# Patient Record
Sex: FEMALE | Race: WHITE | NOT HISPANIC OR LATINO | Employment: OTHER | URBAN - NONMETROPOLITAN AREA
[De-identification: names, ages, dates, MRNs, and addresses within clinical notes are randomized per-mention and may not be internally consistent; named-entity substitution may affect disease eponyms.]

---

## 2017-03-15 ENCOUNTER — COMMUNICATION - GICH (OUTPATIENT)
Dept: FAMILY MEDICINE | Facility: OTHER | Age: 72
End: 2017-03-15

## 2017-03-15 DIAGNOSIS — G89.4 CHRONIC PAIN SYNDROME: ICD-10-CM

## 2017-03-15 DIAGNOSIS — G62.9 POLYNEUROPATHY: ICD-10-CM

## 2017-04-19 ENCOUNTER — OFFICE VISIT - GICH (OUTPATIENT)
Dept: FAMILY MEDICINE | Facility: OTHER | Age: 72
End: 2017-04-19

## 2017-04-19 ENCOUNTER — MEDICAL CORRESPONDENCE (OUTPATIENT)
Dept: HEALTH INFORMATION MANAGEMENT | Facility: CLINIC | Age: 72
End: 2017-04-19

## 2017-04-19 ENCOUNTER — TRANSFERRED RECORDS (OUTPATIENT)
Dept: HEALTH INFORMATION MANAGEMENT | Facility: CLINIC | Age: 72
End: 2017-04-19

## 2017-04-19 ENCOUNTER — HISTORY (OUTPATIENT)
Dept: FAMILY MEDICINE | Facility: OTHER | Age: 72
End: 2017-04-19

## 2017-04-19 DIAGNOSIS — R40.0 SOMNOLENCE: ICD-10-CM

## 2017-04-19 DIAGNOSIS — I10 ESSENTIAL (PRIMARY) HYPERTENSION: ICD-10-CM

## 2017-04-19 DIAGNOSIS — G89.4 CHRONIC PAIN SYNDROME: ICD-10-CM

## 2017-04-19 DIAGNOSIS — G47.00 INSOMNIA: ICD-10-CM

## 2017-04-19 DIAGNOSIS — E06.3 AUTOIMMUNE THYROIDITIS: ICD-10-CM

## 2017-04-19 DIAGNOSIS — F33.1 MAJOR DEPRESSIVE DISORDER, RECURRENT, MODERATE (H): ICD-10-CM

## 2017-04-19 DIAGNOSIS — R53.83 OTHER FATIGUE: ICD-10-CM

## 2017-04-19 DIAGNOSIS — R53.81 OTHER MALAISE: ICD-10-CM

## 2017-04-19 DIAGNOSIS — F41.9 ANXIETY DISORDER: ICD-10-CM

## 2017-04-19 DIAGNOSIS — B37.2 CANDIDIASIS OF SKIN AND NAIL: ICD-10-CM

## 2017-04-19 DIAGNOSIS — G62.9 POLYNEUROPATHY: ICD-10-CM

## 2017-04-19 DIAGNOSIS — R94.6 ABNORMAL RESULTS OF THYROID FUNCTION STUDIES: ICD-10-CM

## 2017-04-19 LAB
FSH - HISTORICAL: 72.6 MIU/ML
LUTEINIZING HORMONE - HISTORICAL: 32.7 MIU/ML
PROLACTIN - HISTORICAL: 13.26 NG/ML
T3,FREE - HISTORICAL: 3.32 PG/ML (ref 2.5–3.9)
T4 FREE SERPL-MCNC: 0.77 NG/DL (ref 0.58–1.64)
TSH - HISTORICAL: <0.1 UIU/ML (ref 0.34–5.6)

## 2017-04-19 ASSESSMENT — ANXIETY QUESTIONNAIRES
3. WORRYING TOO MUCH ABOUT DIFFERENT THINGS: NEARLY EVERY DAY
GAD7 TOTAL SCORE: 15
7. FEELING AFRAID AS IF SOMETHING AWFUL MIGHT HAPPEN: NEARLY EVERY DAY
4. TROUBLE RELAXING: NOT AT ALL
6. BECOMING EASILY ANNOYED OR IRRITABLE: NEARLY EVERY DAY
1. FEELING NERVOUS, ANXIOUS, OR ON EDGE: NEARLY EVERY DAY
2. NOT BEING ABLE TO STOP OR CONTROL WORRYING: NEARLY EVERY DAY
5. BEING SO RESTLESS THAT IT IS HARD TO SIT STILL: NOT AT ALL

## 2017-04-19 ASSESSMENT — PATIENT HEALTH QUESTIONNAIRE - PHQ9: SUM OF ALL RESPONSES TO PHQ QUESTIONS 1-9: 17

## 2017-04-20 ENCOUNTER — AMBULATORY - GICH (OUTPATIENT)
Dept: FAMILY MEDICINE | Facility: OTHER | Age: 72
End: 2017-04-20

## 2017-04-24 ENCOUNTER — AMBULATORY - GICH (OUTPATIENT)
Dept: FAMILY MEDICINE | Facility: OTHER | Age: 72
End: 2017-04-24

## 2017-04-25 ENCOUNTER — AMBULATORY - GICH (OUTPATIENT)
Dept: FAMILY MEDICINE | Facility: OTHER | Age: 72
End: 2017-04-25

## 2017-05-11 ENCOUNTER — COMMUNICATION - GICH (OUTPATIENT)
Dept: FAMILY MEDICINE | Facility: OTHER | Age: 72
End: 2017-05-11

## 2017-05-11 DIAGNOSIS — B00.9 HERPESVIRAL INFECTION: ICD-10-CM

## 2017-06-14 ENCOUNTER — OFFICE VISIT (OUTPATIENT)
Dept: ENDOCRINOLOGY | Facility: CLINIC | Age: 72
End: 2017-06-14

## 2017-06-14 ENCOUNTER — AMBULATORY - GICH (OUTPATIENT)
Dept: SCHEDULING | Facility: OTHER | Age: 72
End: 2017-06-14

## 2017-06-14 VITALS — DIASTOLIC BLOOD PRESSURE: 102 MMHG | SYSTOLIC BLOOD PRESSURE: 159 MMHG | HEART RATE: 103 BPM

## 2017-06-14 DIAGNOSIS — E03.9 HYPOTHYROIDISM, UNSPECIFIED TYPE: Primary | ICD-10-CM

## 2017-06-14 DIAGNOSIS — R63.5 ABNORMAL WEIGHT GAIN: ICD-10-CM

## 2017-06-14 DIAGNOSIS — E03.8 CENTRAL HYPOTHYROIDISM: ICD-10-CM

## 2017-06-14 DIAGNOSIS — E05.90 SUBCLINICAL HYPERTHYROIDISM: ICD-10-CM

## 2017-06-14 DIAGNOSIS — E03.9 HYPOTHYROIDISM, UNSPECIFIED TYPE: ICD-10-CM

## 2017-06-14 DIAGNOSIS — E89.0 H/O PARTIAL THYROIDECTOMY: ICD-10-CM

## 2017-06-14 LAB
CORTIS SERPL-MCNC: 9.6 UG/DL (ref 4–22)
FSH SERPL-ACNC: 65.8 IU/L
LH SERPL-ACNC: 31.2 IU/L
PROLACTIN SERPL-MCNC: 8 UG/L (ref 3–27)
T3 SERPL-MCNC: 146 NG/DL (ref 60–181)
T3FREE SERPL-MCNC: 3.3 PG/ML (ref 2.3–4.2)
T4 FREE SERPL-MCNC: 0.92 NG/DL (ref 0.76–1.46)
T4 SERPL-MCNC: 8.7 UG/DL (ref 4.5–13.9)
TSH SERPL DL<=0.05 MIU/L-ACNC: 0.02 MU/L (ref 0.4–4)

## 2017-06-14 PROCEDURE — 86376 MICROSOMAL ANTIBODY EACH: CPT | Performed by: INTERNAL MEDICINE

## 2017-06-14 PROCEDURE — 84481 FREE ASSAY (FT-3): CPT | Performed by: INTERNAL MEDICINE

## 2017-06-14 PROCEDURE — 84436 ASSAY OF TOTAL THYROXINE: CPT | Performed by: INTERNAL MEDICINE

## 2017-06-14 PROCEDURE — 84146 ASSAY OF PROLACTIN: CPT | Performed by: INTERNAL MEDICINE

## 2017-06-14 PROCEDURE — 82024 ASSAY OF ACTH: CPT | Performed by: INTERNAL MEDICINE

## 2017-06-14 PROCEDURE — 82533 TOTAL CORTISOL: CPT | Performed by: INTERNAL MEDICINE

## 2017-06-14 PROCEDURE — 84480 ASSAY TRIIODOTHYRONINE (T3): CPT | Performed by: INTERNAL MEDICINE

## 2017-06-14 PROCEDURE — 83002 ASSAY OF GONADOTROPIN (LH): CPT | Performed by: INTERNAL MEDICINE

## 2017-06-14 PROCEDURE — 84305 ASSAY OF SOMATOMEDIN: CPT | Performed by: INTERNAL MEDICINE

## 2017-06-14 RX ORDER — VALACYCLOVIR HYDROCHLORIDE 1 G/1
TABLET, FILM COATED ORAL
COMMUNITY
Start: 2017-05-11 | End: 2018-09-05

## 2017-06-14 RX ORDER — GABAPENTIN 300 MG/1
CAPSULE ORAL
COMMUNITY
Start: 2017-04-19 | End: 2018-02-28

## 2017-06-14 RX ORDER — KETOCONAZOLE 20 MG/G
CREAM TOPICAL
COMMUNITY
Start: 2017-04-19

## 2017-06-14 RX ORDER — VENLAFAXINE HYDROCHLORIDE 75 MG/1
CAPSULE, EXTENDED RELEASE ORAL
COMMUNITY
Start: 2016-10-26 | End: 2018-02-28

## 2017-06-14 RX ORDER — PENICILLIN V POTASSIUM 250 MG/1
250 TABLET, FILM COATED ORAL
COMMUNITY
Start: 2016-10-26 | End: 2018-06-10

## 2017-06-14 RX ORDER — VENLAFAXINE HYDROCHLORIDE 150 MG/1
CAPSULE, EXTENDED RELEASE ORAL
COMMUNITY
Start: 2016-10-26 | End: 2018-09-05

## 2017-06-14 ASSESSMENT — PAIN SCALES - GENERAL: PAINLEVEL: NO PAIN (0)

## 2017-06-14 NOTE — PROGRESS NOTES
"                                         - Endocrinology Initial Consultation -    Reason for visit/consult:  hypothyroidism    Primary care provider: Harman Hogan    HPI: 70 yo female here for evaluation of her thyroid functions. She had history of hemithyroidectomy in 1979, was on synthroid since then. Past 2 years, her thyroid test became euthroid, synthroid was stopped. For the past several months,  TSH has been suppressed and she was told \"blood test does not fit thyroid\" by her PMD.     Past 2 years, she gained 40 lb, she has been tired all the time for 4 years. Sleeping during the day.     Appetite: good, Sleep: during the day nap, 11-7 hour sleep at night, hair loss+, very dry skin+, BM: ok  Dizzy no, HA no. Hysterectomy 1989    PMD: Dr. Harman Narayan MD, Nurse Ofe, [phone], [facility]    Past Medical/Surgical History:  Past Medical History:   Diagnosis Date     Hypothyroidism      Past Surgical History:   Procedure Laterality Date     hemithyroidectomy  1979     HYSTERECTOMY  1989       Allergies:  Allergies   Allergen Reactions     Hydrocodone Nausea and Vomiting     Penicillins      Liquid Penicillins         Current Medications   Current Outpatient Prescriptions   Medication     acetaminophen-codeine (TYLENOL #3) 300-30 MG per tablet     gabapentin (NEURONTIN) 300 MG capsule     ketoconazole (NIZORAL) 2 % cream     penicillin V potassium (VEETID) 250 MG tablet     valACYclovir (VALTREX) 1000 mg tablet     venlafaxine (EFFEXOR-XR) 150 MG 24 hr capsule     venlafaxine (EFFEXOR-XR) 75 MG 24 hr capsule     No current facility-administered medications for this visit.    ketoconazole use once a week in summer, once a motnh in winter.     Family History:  No family history on file.    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 Two adult girls,   ROS:  Full review of systems taken with the help of the intake sheet. Otherwise a complete 14 point review of systems was taken and is negative unless stated in the history above.    Physical Exam:   Blood pressure (!) 159/102, pulse 103. Patient refused to weight today.  General: well appearing, no acute distress, pleasant and conversant,   Mental Status/neuro: alert and oriented  Face: symmetrical, normal facial color  Eyes: anicteric, PERRL, no proptosis or lid lag  Visual field: intact  Skull: no hair loss, no thinning roots  Neck: suppler, no lymphadenopahty  Thyroid: palpable thyroid on the right, atrophic, normal texture, no nodule palpable, no bruits  Heart: regular rhythm, S1S2, no murmur appreciated  Lung: clear to auscultation bilaterally  Abdomen: soft, NT/ND, no hepatomegaly  Abdominal skin: no striae  Legs: no swelling or edema    Labs: I reviewed lab from outside facility through epic and extract and summarize here.                         4/2017                    10/2016                9/2015           9/2014  TSH              <0.1                         <0.1                     <0.1               <0.03  Free T4        0.77                         0.49                      1.23                 0.9  Free T3        3.32                         3.71                       3.44                4.04    TPO ab: 12.53 (10/2016)    10/2016 Na 137, K 3.8, Cl 101, CO2 24, Ca 10, BUN 8, Cr 0.91, AST 16, ALT 10    There are five nodules as described above. I have no old films   for comparison to know if these have changed or not. It would be helpful   to get old films for comparison but I would otherwise follow these in six   months to confirm that they are baseline satisfactory.     Nikia Nguyen M.D.  [facility].  FRACISCO/vin  D: 6/3/2014 T: 6/3/2014    Result Narrative   THYROID ULTRASOUND, 6/3/2014    HISTORY:  Known thyroid  nodules.    TECHNIQUE AND FINDINGS: The left thyroid has been removed. I have no old   films available.     FINDINGS:     RIGHT THYROID: The right thyroid measures 5.1 x 1.9 x 2.2 cm and the   isthmus is 2 mm.     There are five nodules identified in the right thyroid lobe and one in the   junction between the isthmus and the right thyroid.     Nodule 1: Measures 10.8 x 8.7 x 10.3 mm, is solid, heterogeneous, has   vascularity, no calcifications.     Nodule 2: Measures 11.3 x 9.9 x 6.3 mm. This is heterogeneous and solid   with some vascularity.     Nodule 3: This one may be a bilobed nodule with two nodules next to each   other. Its overall size is 17.3 mm x 8.6 x 6.4 mm and this is   heterogeneous, solid with some vascularity.     Nodule 4: 9.0 x 7.8 x 6.5 mm, predominantly cyst with some nodularity to   the peripheral edge. It may have a calcification within it.     Nodule 5: 9.7 x 9.3 x 6.6 mm, mid right thyroid near the isthmus. This has   a cystic component and a mild solid component.    Status         Assessment and Plan  71 year old female with hypothyroidism,  # Rule out central hypothyrodism also primary thyroid source today, she came 4 hour drive, will plan as much within today.    - TSH, free T4, free T3, total T3, TPO antibody, TSI, TSH receptor ab  - PRL, IGF1, LH, FSH  - Thyroid sonogram  - Will communicate results to patient and her PMD      I spent 45 minutes with this patient face to face and explained the conditions and plans (more than 50% of time was counseling/coordination of care, possible diagnosis of thyroid condition, work up plan and follow up plan) . The patient understood and is satisfied with today's visit. Return to clinic with me if neccessary.         Georgiana Norman MD  Staff Physician  Endocrinology and Metabolism  License: [id number]

## 2017-06-14 NOTE — MR AVS SNAPSHOT
After Visit Summary   6/14/2017    Annette Layton    MRN: [id number]           Patient Information     Date Of Birth          1945        Visit Information        Provider Department      6/14/2017 1:00 PM Georgiana Norman MD [facility]        Today's Diagnoses     Hypothyroidism, unspecified type    -  1    Central hypothyroidism        Abnormal weight gain        Subclinical hyperthyroidism        H/O partial thyroidectomy          Care Instructions    To expedite your medication refill(s), please contact your pharmacy and have them fax a refill request to: [phone].  *Please allow 3 business days for routine medication refills.  *Please allow 5 business days for controlled substance medication refills.  --------------------  For scheduling appointments (including lab work), please request an appointment through [employer], or call: [phone].    For questions for your provider or the endocrine nurse, please send a W[employer] message.  For after-hours urgent issues, please dial [phone], and ask to speak with the Endocrinologist On-Call.  --------------------  Please Note: If you are active on [employer], all future test results will be sent by [employer] message only and will no longer be sent by mail. You may also receive communication directly from your physician.            Follow-ups after your visit        Your next 10 appointments already scheduled     Jun 14, 2017  2:00 PM CDT   LAB with [facility] ([facility])    [address] [place] [address] [phone]           Patient must bring picture ID.  Patient should be prepared to give a urine specimen  Please do not eat 10-12 hours before your appointment if you are coming in fasting for labs on lipids, cholesterol, or glucose (sugar).  Pregnant women should follow their Care Team instructions. Water with medications is okay. Do not drink coffee or other fluids.    If you have concerns about taking  your medications, please ask at office or if scheduling via [employer], send a message by clicking on Secure Messaging, Message Your Care Team.            Jun 14, 2017  4:00 PM CDT   US THYROID with UCUS3   [facility] ([facility])    [address] [address] [phone]           Please bring a list of your medicines (including vitamins, minerals and over-the-counter drugs). Also, tell your doctor about any allergies you may have. Wear comfortable clothes and leave your valuables at home.  You do not need to do anything special to prepare for your exam.  Please call the Imaging Department at your exam site with any questions.              Future tests that were ordered for you today     Open Future Orders        Priority Expected Expires Ordered    Adrenal corticotropin Routine  6/14/2018 6/14/2017    US Thyroid Routine  1/10/2018 6/14/2017    TSH Routine  6/14/2018 6/14/2017    T4 free Routine  6/14/2018 6/14/2017    Thyroxine total Routine  6/14/2018 6/14/2017    T3 total Routine  6/14/2018 6/14/2017    Thyroid peroxidase antibody Routine  6/14/2018 6/14/2017    Thyroid stimulating immunoglobulin Routine  6/14/2018 6/14/2017    Prolactin Routine  6/14/2018 6/14/2017    Insulin growth factor 1 Routine  6/14/2018 6/14/2017    Lutropin Routine  6/14/2018 6/14/2017    Follicle stimulating hormone Routine  6/14/2018 6/14/2017    Cortisol Routine  6/14/2018 6/14/2017            Who to contact     Please call your clinic at [phone] to:    Ask questions about your health    Make or cancel appointments    Discuss your medicines    Learn about your test results    Speak to your doctor   If you have compliments or concerns about an experience at your clinic, or if you wish to file a complaint, please contact [facility] Patient Relations at [phone] or email us at  [email or web address]         Additional Information About Your Visit        [employer] Information     [employer] gives you secure access to your electronic health record. If you see a primary care provider, you can also send messages to your care team and make appointments. If you have questions, please call your primary care clinic.  If you do not have a primary care provider, please call [phone] and they will assist you.      [employer] is an electronic gateway that provides easy, online access to your medical records. With [employer], you can request a clinic appointment, read your test results, renew a prescription or communicate with your care team.     To access your existing account, please contact your [facility] or call [phone] for assistance.        Care EveryWhere ID     This is your Care EveryWhere ID. This could be used by other organizations to access your [place] medical records  [id number]        Your Vitals Were     Pulse                   103            Blood Pressure from Last 3 Encounters:   06/14/17 (!) 159/102    Weight from Last 3 Encounters:   No data found for Wt               Primary Care Provider Office Phone # Fax #    Harman Hogan [phone]       [facility] [address] [facility] [address]        Thank you!     Thank you for choosing [facility]  for your care. Our goal is always to provide you with excellent care. Hearing back from our patients is one way we can continue to improve our services. Please take a few minutes to complete the written survey that you may receive in the mail after your visit with us. Thank you!             Your Updated Medication List - Protect others around you: Learn how to safely use, store and throw away your medicines at www.disposemymeds.org.          This list is accurate as of: 6/14/17  1:54 PM.  Always use your most recent med list.                   Brand  Name Dispense Instructions for use    acetaminophen-codeine 300-30 MG per tablet    TYLENOL #3     Take 1 tablet by mouth       gabapentin 300 MG capsule    NEURONTIN     TAKE 2 CAPSULE BY MOUTH TWICE DAILY FOR BURNING FEET AND BACK PAIN       ketoconazole 2 % cream    NIZORAL         penicillin V potassium 250 MG tablet    VEETID     Take 250 mg by mouth       valACYclovir 1000 mg tablet    VALTREX         * venlafaxine 150 MG 24 hr capsule    EFFEXOR-XR     TAKE 1 CAPSULE BY MOUTH DAILY ALONG WITH A 75MG CAPSULE FOR A TOTAL  MG DAILY TAKE WITH FOOD       * venlafaxine 75 MG 24 hr capsule    EFFEXOR-XR     TAKE 1 CAPSULE BY MOUTH DAILY WITH 150 MG CAPSULE FOR A TOTAL  MG DAILY TAKE WITH FOOD       * Notice:  This list has 2 medication(s) that are the same as other medications prescribed for you. Read the directions carefully, and ask your doctor or other care provider to review them with you.

## 2017-06-14 NOTE — LETTER
"6/14/2017       RE: Annette Layton  [address] [facility] [address]     Dear Colleague,    Thank you for referring your patient, Annette Layton, to the [facility] END[facility] at [facility]. Please see a copy of my visit note below.                                             - Endocrinology Initial Consultation -    Reason for visit/consult:  hypothyroidism    Primary care provider: Harman Hogan    HPI: 70 yo female here for evaluation of her thyroid functions. She had history of hemithyroidectomy in 1979, was on synthroid since then. Past 2 years, her thyroid test became euthroid, synthroid was stopped. For the past several months,  TSH has been suppressed and she was told \"blood test does not fit thyroid\" by her PMD.     Past 2 years, she gained 40 lb, she has been tired all the time for 4 years. Sleeping during the day.     Appetite: good, Sleep: during the day nap, 11-7 hour sleep at night, hair loss+, very dry skin+, BM: ok  Dizzy no, HA no. Hysterectomy 1989    PMD: Dr. Harman Narayan MD, Nurse Ofe, [phone], [facility]    Past Medical/Surgical History:  Past Medical History:   Diagnosis Date     Hypothyroidism      Past Surgical History:   Procedure Laterality Date     hemithyroidectomy  1979     HYSTERECTOMY  1989       Allergies:  Allergies   Allergen Reactions     Hydrocodone Nausea and Vomiting     Penicillins      Liquid Penicillins         Current Medications   Current Outpatient Prescriptions   Medication     acetaminophen-codeine (TYLENOL #3) 300-30 MG per tablet     gabapentin (NEURONTIN) 300 MG capsule     ketoconazole (NIZORAL) 2 % cream     penicillin V potassium (VEETID) 250 MG tablet     valACYclovir (VALTREX) 1000 mg tablet     venlafaxine (EFFEXOR-XR) 150 MG 24 hr capsule     venlafaxine (EFFEXOR-XR) 75 MG 24 hr capsule     No current facility-administered medications for this visit.    ketoconazole use once a week in summer, once a motnh " in winter.     Family History:  No family history on file.    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 Two adult girls,   ROS:  Full review of systems taken with the help of the intake sheet. Otherwise a complete 14 point review of systems was taken and is negative unless stated in the history above.    Physical Exam:   Blood pressure (!) 159/102, pulse 103. Patient refused to weight today.  General: well appearing, no acute distress, pleasant and conversant,   Mental Status/neuro: alert and oriented  Face: symmetrical, normal facial color  Eyes: anicteric, PERRL, no proptosis or lid lag  Visual field: intact  Skull: no hair loss, no thinning roots  Neck: suppler, no lymphadenopahty  Thyroid: palpable thyroid on the right, atrophic, normal texture, no nodule palpable, no bruits  Heart: regular rhythm, S1S2, no murmur appreciated  Lung: clear to auscultation bilaterally  Abdomen: soft, NT/ND, no hepatomegaly  Abdominal skin: no striae  Legs: no swelling or edema    Labs: I reviewed lab from outside facility through epic and extract and summarize here.                         4/2017                    10/2016                9/2015           9/2014  TSH              <0.1                         <0.1                     <0.1               <0.03  Free T4        0.77                         0.49                      1.23                 0.9  Free T3        3.32                         3.71                       3.44                4.04    TPO ab: 12.53 (10/2016)    10/2016 Na 137, K 3.8, Cl 101, CO2 24, Ca 10, BUN 8, Cr 0.91, AST 16, ALT 10    There are five nodules as described above. I have no old films   for comparison to know if these have changed or not. It would be helpful   to get old films for comparison but I would  otherwise follow these in six   months to confirm that they are baseline satisfactory.     Nikia Nguyen M.D.  [facility].  MTB/jsy  D: 6/3/2014 T: 6/3/2014    Result Narrative   THYROID ULTRASOUND, 6/3/2014    HISTORY:  Known thyroid nodules.    TECHNIQUE AND FINDINGS: The left thyroid has been removed. I have no old   films available.     FINDINGS:     RIGHT THYROID: The right thyroid measures 5.1 x 1.9 x 2.2 cm and the   isthmus is 2 mm.     There are five nodules identified in the right thyroid lobe and one in the   junction between the isthmus and the right thyroid.     Nodule 1: Measures 10.8 x 8.7 x 10.3 mm, is solid, heterogeneous, has   vascularity, no calcifications.     Nodule 2: Measures 11.3 x 9.9 x 6.3 mm. This is heterogeneous and solid   with some vascularity.     Nodule 3: This one may be a bilobed nodule with two nodules next to each   other. Its overall size is 17.3 mm x 8.6 x 6.4 mm and this is   heterogeneous, solid with some vascularity.     Nodule 4: 9.0 x 7.8 x 6.5 mm, predominantly cyst with some nodularity to   the peripheral edge. It may have a calcification within it.     Nodule 5: 9.7 x 9.3 x 6.6 mm, mid right thyroid near the isthmus. This has   a cystic component and a mild solid component.    Status         Assessment and Plan  71 year old female with hypothyroidism,  # Rule out central hypothyrodism also primary thyroid source today, she came 4 hour drive, will plan as much within today.    - TSH, free T4, free T3, total T3, TPO antibody, TSI, TSH receptor ab  - PRL, IGF1, LH, FSH  - Thyroid sonogram  - Will communicate results to patient and her PMD      I spent 45 minutes with this patient face to face and explained the conditions and plans (more than 50% of time was counseling/coordination of care, possible diagnosis of thyroid condition, work up plan and follow up plan) . The patient understood and is satisfied with today's visit. Return to clinic  with me if neccessary.         Georgiana Norman MD  Staff Physician  Endocrinology and Metabolism  License: [id number]

## 2017-06-15 LAB
THYROPEROXIDASE AB SERPL-ACNC: 25 IU/ML
TSH RECEP AB SER-ACNC: NORMAL [IU]/L

## 2017-06-16 LAB
ACTH PLAS-MCNC: 18 PG/ML
IGF-I BLD-MCNC: 150 NG/ML (ref 26–226)
TSI SER-ACNC: NORMAL

## 2017-06-21 PROBLEM — E03.9 HYPOTHYROIDISM: Status: ACTIVE | Noted: 2017-06-21

## 2017-06-22 ENCOUNTER — AMBULATORY - GICH (OUTPATIENT)
Dept: FAMILY MEDICINE | Facility: OTHER | Age: 72
End: 2017-06-22

## 2017-06-27 ENCOUNTER — TELEPHONE (OUTPATIENT)
Dept: ENDOCRINOLOGY | Facility: CLINIC | Age: 72
End: 2017-06-27

## 2017-09-21 ENCOUNTER — AMBULATORY - GICH (OUTPATIENT)
Dept: FAMILY MEDICINE | Facility: OTHER | Age: 72
End: 2017-09-21

## 2017-09-21 DIAGNOSIS — G89.4 CHRONIC PAIN SYNDROME: ICD-10-CM

## 2017-10-16 DIAGNOSIS — E05.90 HYPERTHYROIDISM: ICD-10-CM

## 2017-10-16 NOTE — TELEPHONE ENCOUNTER
methimazole (TAPAZOLE) 5 MG tablet  Last Written Prescription Date:  6/21/17  Last Fill Quantity: 30,   # refills: 3  Last Office Visit with [facility], [facility] or [facility] prescribing provider: 6/14/17  Future Office visit:   none    Routing refill request to provider for review/approval because:   methimazole (TAPAZOLE) 5 MG tablet, not on [facility] protocol

## 2017-10-17 RX ORDER — METHIMAZOLE 5 MG/1
5 TABLET ORAL DAILY
Qty: 30 TABLET | Refills: 3 | Status: SHIPPED | OUTPATIENT
Start: 2017-10-17 | End: 2018-02-14

## 2017-12-12 ENCOUNTER — COMMUNICATION - GICH (OUTPATIENT)
Dept: FAMILY MEDICINE | Facility: OTHER | Age: 72
End: 2017-12-12

## 2017-12-12 DIAGNOSIS — I06.1 RHEUMATIC AORTIC INSUFFICIENCY: ICD-10-CM

## 2017-12-13 ENCOUNTER — COMMUNICATION - GICH (OUTPATIENT)
Dept: FAMILY MEDICINE | Facility: OTHER | Age: 72
End: 2017-12-13

## 2017-12-13 DIAGNOSIS — F41.1 GENERALIZED ANXIETY DISORDER: ICD-10-CM

## 2018-01-03 NOTE — TELEPHONE ENCOUNTER
Patient Information     Patient Name MRN Annette Zuñiga [id number] Female 1945      Telephone Encounter by Joon Erwin RN at 3/16/2017 10:38 AM     Author:  Joon Erwin RN Service:  (none) Author Type:  NURS- Registered Nurse     Filed:  3/16/2017 10:44 AM Encounter Date:  3/15/2017 Status:  Signed     :  Joon Erwin RN (NURS- Registered Nurse)            Nsaids    Office visit in the past 12 months or per provider note.    Last visit with TORIBIO FLAHERTY was on: 10/26/2016 in [facility]  Next visit with TORIBIO FLAHERTY is on: No future appointment listed with this provider  Next visit with Family Practice is on: No future appointment listed in this department    Max refill for 12 months from last office visit or per provider note.    Chart review shows that last office visit to address use of gabapentin noted to be on 10/26/16 with PCP. PCP states in office visit notes that patient is to continue on her current medications as she has been. Will refill rx as requested as well as update requested sig from pharmacy to match current sig in chart.    Prescription refilled per RN Medication Refill Policy.................... Joon Erwin RN ....................  3/16/2017   10:41 AM

## 2018-01-04 NOTE — PROGRESS NOTES
Patient Information     Patient Name MRN Sex Annette Ivan [id number] Female 1945      Progress Notes by Harman Hogan MD at 2017 11:15 AM     Author:  Harman Hogan MD Service:  (none) Author Type:  Physician     Filed:  2017  9:03 AM Encounter Date:  2017 Status:  Signed     :  Harman Hogan MD (Physician)            There are no exam notes on file for this visit.    SUBJECTIVE:  Annette Layton  is a 71 y.o. female who comes in today for follow-up. I last saw her 6 months ago. At that time, she was still having a dry mouth and fatigue. She had been on vitamin D and not been on thyroid medication. She continues to smoke. The plan at her last visit was to continue her medications and recommended a consultation with endocrinologist that she had normal thyroid function and suppressed TSH. She did not follow through with that because of bad weather.    She has some anxiety about the political climate. She is very worried about Pres. Trump and the possibility of getting in a international conflict. She will wake up at night feeling panicked.    She has a bunion on her right toe. This will bother from time to time.    She has some balance troubles.  She continues to have burning in her feet consistent with peripheral neuropathy and this certainly affects her balance on uneven ground and in the dark.    She has excessive daytime somnolence. She will sleep a lot of the day. Not sure if she snores. She really rarely gets out of bed except if she goes to an athletic event for one of her grandkids.    Past Medical, Family, and Social History reviewed and updated as noted below.   ROS is negative except as noted above       Allergies     Allergen  Reactions     Hydrocodone GI Upset   ,   Family History      Problem  Relation Age of Onset     Good Health Brother      Good Health Brother      Cancer-breast No Family History    ,   Current Outpatient Prescriptions on File Prior to Visit        Medication  Sig Dispense Refill     penicillin v potassium (PEN-VEE K) 250 mg tablet Take 1 tablet by mouth 2 times daily before meals. 180 tablet 3     valACYclovir (VALTREX) 1 gram tablet TAKE 2 TABLETS BY MOUTH EVERY 12 HOURS TIMES 2 DOSES AT ONSET OF COLD SORES 24 tablet 2     venlafaxine (EFFEXOR XR) 150 mg Extended-Release capsule TAKE 1 CAPSULE BY MOUTH DAILY ALONG WITH A 75MG CAPSULE FOR A TOTAL  MG DAILY TAKE WITH FOOD 90 capsule 3     venlafaxine (EFFEXOR XR) 75 mg cp24 Extended-Release capsule TAKE 1 CAPSULE BY MOUTH DAILY WITH 150 MG CAPSULE FOR A TOTAL  MG DAILY TAKE WITH FOOD 90 capsule 3     No current facility-administered medications on file prior to visit.    ,   Past Medical History:     Diagnosis  Date     Fibromyalgia      Hx of pregnancy     childbirth      Lyme disease      Mono exposure      Panic disorder      Pneumonia      Rheumatic fever    ,   Patient Active Problem List       Diagnosis  Date Noted     Anxiety  04/20/2017     Peripheral neuropathy (HC)  12/05/2014     Pain medication agreement  09/04/2014     Tylenol #3 #120 per month max.        Sweating abnormality  09/04/2014     ACP (advance care planning)  12/10/2013     Osteoarthritis  07/23/2013     Vitamin D deficiency  04/22/2013     Major depression, recurrent (HC)  03/01/2013     FIBROMYALGIA       GASTROESOPHAGEAL REFLUX DISEASE, CHRONIC       EDEMA, ANKLES  05/26/2011     relieved overnight          DISTURBANCE OF SKIN SENSATION  05/25/2011     burning sensation both feet and lower legs especially at night.          CHRONIC PAIN SYNDROME  04/05/2010     WEIGHT LOSS, ABNORMAL  01/21/2010     PAIN IN JOINT, MULTIPLE SITES  04/06/2009     HERPES LABIALIS  11/17/2008     ANXIETY DISORDER, GENERALIZED  07/31/2008     PANIC DISORDER  11/17/2006     MITRAL VALVE PROLAPSE  11/30/2005     FATIGUE, CHRONIC  05/19/2005     HYPERTENSION  09/03/2004     HYPERLIPIDEMIA  03/26/2004     SCOLIOSIS, THORACIC SPINE   01/30/2004     CERVICAL DISC DISORDER  05/22/2002     OSTEOPENIA  02/26/2002     RHEUMATIC AORTIC INSUFFICIENCY  02/26/2002     INSOMNIA  01/14/2000     HYPOTHYROIDISM  11/15/1999     TOBACCO USER  11/04/1999   ,   Past Surgical History:      Procedure  Laterality Date     BREAST BIOPSY       COLONOSCOPY  09/19/2000     HYSTERECTOMY       THYROIDECTOMY      and   Social History      Substance Use Topics        Smoking status:  Current Every Day Smoker     Packs/day: 1.00     Years: 40.00     Types: Cigarettes     Smokeless tobacco:  Never Used     Alcohol use  No     OBJECTIVE:  /100  Pulse 96  Breastfeeding? No   EXAM:  Alert and cooperative, no distress. Affect is somewhat restricted although she will smile. She still seems to have a good sense of humor.  PHQ Depression Screening 10/26/2016 4/19/2017   Date of PHQ exam (doc flow) 10/26/2016 4/19/2017   1. Lack of interest/pleasure 3 - Nearly every day 3 - Nearly every day   2. Feeling down/depressed 2 - More than half the days 3 - Nearly every day   PHQ-2 TOTAL SCORE 5 6   3. Trouble sleeping 1 - Several days 3 - Nearly every day   4. Decreased energy 3 - Nearly every day 3 - Nearly every day   5. Appetite change 3 - Nearly every day 3 - Nearly every day   6. Feelings of failure 2 - More than half the days 0 - Not at all   7. Trouble concentrating 0 - Not at all 2 - More than half the days   8. Activity level 0 - Not at all 0 - Not at all   9. Hurting yourself 0 - Not at all 0 - Not at all   PHQ-9 TOTAL SCORE 14 17   PHQ-9 Severity Level moderate moderately severe   Functional Impairment very difficult very difficult      MARY-7 ANXIETY SCREENING 10/26/2016 4/19/2017   MARY date (doc flow) 10/26/2016 4/19/2017   Nervous, anxious 0 3   Cannot stop worrying 0 3   Worry about different things 0 3   Cannot relax 0 0   Feeling restless 0 0   Easily annoyed/irritated 0 3   Afraid of awful event 0 3   Score 0 15   Severity none severe anxiety      ASSESSMENT/Plan  :      Annette was seen today for medication management.    Diagnoses and all orders for this visit:    Daytime somnolence  -     AMB CONSULT TO SLEEP STUDIES; Future    Candidal intertrigo  -     ketoconazole 2% topical (NIZORAL) cream; Apply  topically to affected area(s) 2 times daily.    Chronic pain syndrome  -     gabapentin (NEURONTIN) 300 mg capsule; TAKE 2 CAPSULE BY MOUTH TWICE DAILY FOR BURNING FEET AND BACK PAIN  -     acetaminophen-codeine, 300-30 mg, (TYLENOL #3) tablet; Take 1 tablet by mouth every 4 hours if needed.    Peripheral polyneuropathy (HC)  -     gabapentin (NEURONTIN) 300 mg capsule; TAKE 2 CAPSULE BY MOUTH TWICE DAILY FOR BURNING FEET AND BACK PAIN    Hashimoto's thyroiditis  -     TSH; Future  -     T4,FREE; Future  -     T3,FREE; Future  -     AMB CONSULT TO ENDOCRINOLOGY; Future  -     TSH  -     T4,FREE  -     T3,FREE    Low TSH level  -     PROLACTIN; Future  -     FSH; Future  -     LUTEINIZING HORMONE; Future  -     PROLACTIN  -     FSH  -     LUTEINIZING HORMONE    HYPERTENSION    Insomnia, unspecified type    FATIGUE, CHRONIC    Moderate episode of recurrent major depressive disorder (HC)    Anxiety    Lengthy discussion today with regard to the possibility that she may have pituitary dysfunction because of low TSH and low free T4. She has a history of Hashimoto's thyroiditis. She seemed to be somewhat focused on possibility of previous Lyme disease and some of the current pop culture thoughts about thyroid hormone and testing for that. At this point, feel that endocrinology consultation is appropriate. We'll repeat pituitary testing today as well as thyroid tests. She may have some abnormality of the hypothalamic pituitary axis, but would look forward to consultation from the endocrinologist to delineate this further and pursue any further diagnostic or provocative tests that might be needed.    There is also possibility she has some type of sleep disorder such as narcolepsy or  obstructive sleep apnea that could be contributing to her significant fatigue and referral for sleep consultation is sent.    For her peripheral neuropathy, we discussed nightlights, use a walking stick, excluding care in her balance as well as increasing gabapentin up to 2 tablets of the 300 mg strength twice daily.    She would like renewal on the Nizoral cream for monilial intertrigo that she uses intermittently.    She continues on Effexor XR at 225 mg daily but her depressive symptoms seem to be worse. We had a discussion today with regard to psychiatric consultation with this point she wishes to hold off until she sorts through some of these other things which seems reasonable. She wondered about increasing the Effexor up to 300 mg. While I would not be completely opposed to that, I don't really want to change the gabapentin and the Effexor at the same time as we won't know if she has side effects which of the 2 medications and will be coming from.    Renewal given on Tylenol 3 #120 with refills ×5. She still has three quarters of her last bottle filled and  query is appropriate.    A total of 40 minutes was spent with the patient, greater than 50% of the time was spent in counseling/discussion of the aforementioned concerns.       Harman Hogan MD

## 2018-01-04 NOTE — PATIENT INSTRUCTIONS
Patient Information     Patient Name MRN Sex Annette Ivan [id number] Female 1945      Patient Instructions by Harman Hogan MD at 2017 11:15 AM     Author:  Harman Hogan MD  Service:  (none) Author Type:  Physician     Filed:  2017 12:52 PM  Encounter Date:  2017 Status:  Addendum     :  Harman Hogan MD (Physician)        Related Notes: Original Note by Harman Hogan MD (Physician) filed at 2017 12:32 PM            Increase gabapentin to 2 capsules twice daily for a month, then follow up. Should improve the burning in the feet. Continue current medications.    See the endocrinologist at the [facility].    See the sleep specialist here.

## 2018-01-05 NOTE — TELEPHONE ENCOUNTER
Patient Information     Patient Name MRN Sex Annette Ivan [id number] Female 1945      Telephone Encounter by Joon Erwin RN at 2017 12:44 PM     Author:  Joon Erwin RN Service:  (none) Author Type:  NURS- Registered Nurse     Filed:  2017 12:50 PM Encounter Date:  2017 Status:  Signed     :  Joon Erwin RN (NURS- Registered Nurse)            This is a Refill request from: [facility]  Name of Medication: Valacyclovir  Quantity requested: 24 tabs with 2 refills  Last fill date: 16 per rx request  Due for refill: Yes, as per chart and rx request  Last visit with TORIBIO HOGAN was on: 2017 in [facility]  PCP:  Toribio Hogan MD  Controlled Substance Agreement:  N/A   Diagnosis r/t this medication request: Herpes simplex virus (HSV) infection    Chart review shows that patient was most recently seen by PCP on 17. Dx as noted above not addressed at that office visit with PCP, but PCP does state for patient to continue on her current medications. Will route rx request to PCP for his consideration/approval.     Unable to complete prescription refill per RN Medication Refill Policy.................... Joon Erwin RN ....................  2017   12:44 PM

## 2018-01-26 VITALS — HEART RATE: 96 BPM | SYSTOLIC BLOOD PRESSURE: 174 MMHG | DIASTOLIC BLOOD PRESSURE: 100 MMHG

## 2018-01-28 ASSESSMENT — ANXIETY QUESTIONNAIRES: GAD7 TOTAL SCORE: 15

## 2018-01-28 ASSESSMENT — PATIENT HEALTH QUESTIONNAIRE - PHQ9: SUM OF ALL RESPONSES TO PHQ QUESTIONS 1-9: 17

## 2018-02-12 NOTE — TELEPHONE ENCOUNTER
Patient Information     Patient Name MRN Sex Annette Ivan [id number] Female 1945      Telephone Encounter by Joon Erwin RN at 2017  4:44 PM     Author:  Joon Erwin RN Service:  (none) Author Type:  NURS- Registered Nurse     Filed:  2017  4:50 PM Encounter Date:  2017 Status:  Signed     :  Joon Erwin RN (NURS- Registered Nurse)            This is a Refill request from: [facility]  Name of Medication: Penicillin V Potassium  Quantity requested: 180 tabs with 1 refill  Last fill date: 17 for a 90 day supply as per rx request  Due for refill: Yes, as per rx request and as per chart review  Last visit with TORIBIO HOGAN was on: 2017 in [facility]  PCP:  Toribio Hogan MD  Controlled Substance Agreement: N/A   Diagnosis r/t this medication request: Rheumatic aortic insufficiency    Chart review shows that patient was last seen by PCP on 17. Office visit notes on that date do include rx as requested. No changes to rx as requested noted in office visit notes on that date. Patient would be due for annual office visit with PCP on 2018. Writer will route rx request to PCP for his consideration/approval at this time.     Unable to complete prescription refill per RN Medication Refill Policy.................... Joon Erwin RN ....................  2017   4:44 PM

## 2018-02-12 NOTE — TELEPHONE ENCOUNTER
Patient Information     Patient Name MRN Annette Zuñiga [id number] Female 1945      Telephone Encounter by Joon Erwin RN at 2017  1:23 PM     Author:  Joon Erwin RN Service:  (none) Author Type:  NURS- Registered Nurse     Filed:  2017  1:27 PM Encounter Date:  2017 Status:  Signed     :  Joon Erwin RN (NURS- Registered Nurse)            Depression-in adults 18 and over  Serotonin/Norepinephrine Reuptake Inhibitors    Office visit in the past 12 months or as indicated in chart.  Should have clinic visit 1-2 months after initial prescription.    Last visit with TORIBIO FLAHERTY was on: 2017 in [facility]  Next visit with TORIBIO FLAHERTY is on: No future appointment listed with this provider  Next visit with Family Practice is on: No future appointment listed in this department    Max refills 12 months from last office visit or per providers notes.    PHQ Depression Screening 10/26/2016 2017   Date of PHQ exam (doc flow) 10/26/2016 2017   1. Lack of interest/pleasure 3 - Nearly every day 3 - Nearly every day   2. Feeling down/depressed 2 - More than half the days 3 - Nearly every day   PHQ-2 TOTAL SCORE 5 6   3. Trouble sleeping 1 - Several days 3 - Nearly every day   4. Decreased energy 3 - Nearly every day 3 - Nearly every day   5. Appetite change 3 - Nearly every day 3 - Nearly every day   6. Feelings of failure 2 - More than half the days 0 - Not at all   7. Trouble concentrating 0 - Not at all 2 - More than half the days   8. Activity level 0 - Not at all 0 - Not at all   9. Hurting yourself 0 - Not at all 0 - Not at all   PHQ-9 TOTAL SCORE 14 17   PHQ-9 Severity Level moderate moderately severe   Functional Impairment very difficult very difficult   Some recent data might be hidden       Chart review shows that patient was seen by PCP last for diagnosis of depression on 17. Both rxs as requested were noted in office visit notes on  that date. PCP states for patient to continue on her current medications. Patient will be due for office visit with PCP in April 2018. Writer can only fill rxs as requested for a 90 day supply at this time.    Prescription refilled per RN Medication Refill Policy.................... Joon Erwin RN ....................  12/14/2017   1:26 PM

## 2018-02-14 DIAGNOSIS — E05.90 HYPERTHYROIDISM: ICD-10-CM

## 2018-02-15 NOTE — TELEPHONE ENCOUNTER
methimazole     Last Written Prescription Date:  10/17/17  Last Fill Quantity: 30   # refills: 3  Last Office Visit : 6/14/17  Future Office visit: no future appt    Routing refill request to provider for review/approval because:   Non-Protocol  Scheduling has been notified to contact the pt for appointment.

## 2018-02-16 PROBLEM — R79.89 ABNORMAL TSH: Status: ACTIVE | Noted: 2018-02-16

## 2018-02-16 RX ORDER — METHIMAZOLE 5 MG/1
5 TABLET ORAL DAILY
Qty: 14 TABLET | Refills: 0 | Status: SHIPPED | OUTPATIENT
Start: 2018-02-16 | End: 2018-02-28

## 2018-02-26 DIAGNOSIS — R94.6 NONSPECIFIC ABNORMAL RESULTS OF THYROID FUNCTION STUDY: Primary | ICD-10-CM

## 2018-02-26 LAB
T4 FREE SERPL-MCNC: 0.57 NG/DL (ref 0.6–1.6)
TSH SERPL DL<=0.05 MIU/L-ACNC: 2.18 IU/ML (ref 0.34–5.6)

## 2018-02-26 PROCEDURE — 84443 ASSAY THYROID STIM HORMONE: CPT | Performed by: INTERNAL MEDICINE

## 2018-02-26 PROCEDURE — 84439 ASSAY OF FREE THYROXINE: CPT | Performed by: INTERNAL MEDICINE

## 2018-02-26 PROCEDURE — 36415 COLL VENOUS BLD VENIPUNCTURE: CPT | Performed by: INTERNAL MEDICINE

## 2018-02-27 ENCOUNTER — DOCUMENTATION ONLY (OUTPATIENT)
Dept: ENDOCRINOLOGY | Facility: CLINIC | Age: 73
End: 2018-02-27

## 2018-02-27 NOTE — PROGRESS NOTES
I talked with the patient, she had had suppressed TSH over 3 years, started to methimazole June 2017, and now she started to become hypothyroidism.  By asking she mentioned she has a fatigue.  Discussed with her, we will try to hold methimazole for 1 months, and a repeat TSH and free T4 in 1 months at her primary care physician's office.  She understood.    Georgiana Norman MD  Staff Physician  Endocrinology and Metabolism  [facility]  License: [place] [id number]  Pager: [phone]

## 2018-02-28 ENCOUNTER — OFFICE VISIT (OUTPATIENT)
Dept: FAMILY MEDICINE | Facility: OTHER | Age: 73
End: 2018-02-28
Attending: FAMILY MEDICINE
Payer: COMMERCIAL

## 2018-02-28 ENCOUNTER — AMBULATORY - GICH (OUTPATIENT)
Dept: FAMILY MEDICINE | Facility: OTHER | Age: 73
End: 2018-02-28

## 2018-02-28 VITALS — DIASTOLIC BLOOD PRESSURE: 94 MMHG | SYSTOLIC BLOOD PRESSURE: 156 MMHG | HEART RATE: 92 BPM

## 2018-02-28 DIAGNOSIS — M17.0 PRIMARY OSTEOARTHRITIS OF BOTH KNEES: ICD-10-CM

## 2018-02-28 DIAGNOSIS — G89.29 CHRONIC LOW BACK PAIN, UNSPECIFIED BACK PAIN LATERALITY, WITH SCIATICA PRESENCE UNSPECIFIED: ICD-10-CM

## 2018-02-28 DIAGNOSIS — F41.9 ANXIETY: ICD-10-CM

## 2018-02-28 DIAGNOSIS — R32 URINARY INCONTINENCE, UNSPECIFIED TYPE: ICD-10-CM

## 2018-02-28 DIAGNOSIS — E03.9 HYPOTHYROIDISM, UNSPECIFIED TYPE: Primary | ICD-10-CM

## 2018-02-28 DIAGNOSIS — G89.4 CHRONIC PAIN SYNDROME: ICD-10-CM

## 2018-02-28 DIAGNOSIS — G62.9 POLYNEUROPATHY: ICD-10-CM

## 2018-02-28 DIAGNOSIS — F41.1 GENERALIZED ANXIETY DISORDER: ICD-10-CM

## 2018-02-28 DIAGNOSIS — G89.29 OTHER CHRONIC PAIN: ICD-10-CM

## 2018-02-28 DIAGNOSIS — M54.5 CHRONIC LOW BACK PAIN, UNSPECIFIED BACK PAIN LATERALITY, WITH SCIATICA PRESENCE UNSPECIFIED: ICD-10-CM

## 2018-02-28 PROCEDURE — 99214 OFFICE O/P EST MOD 30 MIN: CPT | Performed by: FAMILY MEDICINE

## 2018-02-28 PROCEDURE — G0463 HOSPITAL OUTPT CLINIC VISIT: HCPCS

## 2018-02-28 RX ORDER — VENLAFAXINE HYDROCHLORIDE 150 MG/1
150 CAPSULE, EXTENDED RELEASE ORAL DAILY
Qty: 90 CAPSULE | Refills: 3 | Status: SHIPPED | OUTPATIENT
Start: 2018-02-28 | End: 2019-02-26

## 2018-02-28 RX ORDER — GABAPENTIN 300 MG/1
600 CAPSULE ORAL 2 TIMES DAILY
Qty: 360 CAPSULE | Refills: 11 | Status: SHIPPED | OUTPATIENT
Start: 2018-02-28 | End: 2019-05-06

## 2018-02-28 RX ORDER — VENLAFAXINE HYDROCHLORIDE 75 MG/1
75 CAPSULE, EXTENDED RELEASE ORAL DAILY
Qty: 90 CAPSULE | Refills: 11 | Status: SHIPPED | OUTPATIENT
Start: 2018-02-28 | End: 2019-05-06

## 2018-02-28 ASSESSMENT — ANXIETY QUESTIONNAIRES
IF YOU CHECKED OFF ANY PROBLEMS ON THIS QUESTIONNAIRE, HOW DIFFICULT HAVE THESE PROBLEMS MADE IT FOR YOU TO DO YOUR WORK, TAKE CARE OF THINGS AT HOME, OR GET ALONG WITH OTHER PEOPLE: SOMEWHAT DIFFICULT
2. NOT BEING ABLE TO STOP OR CONTROL WORRYING: NOT AT ALL
1. FEELING NERVOUS, ANXIOUS, OR ON EDGE: NOT AT ALL
7. FEELING AFRAID AS IF SOMETHING AWFUL MIGHT HAPPEN: SEVERAL DAYS
3. WORRYING TOO MUCH ABOUT DIFFERENT THINGS: NOT AT ALL
5. BEING SO RESTLESS THAT IT IS HARD TO SIT STILL: NOT AT ALL

## 2018-02-28 ASSESSMENT — PATIENT HEALTH QUESTIONNAIRE - PHQ9: 5. POOR APPETITE OR OVEREATING: NOT AT ALL

## 2018-02-28 ASSESSMENT — PAIN SCALES - GENERAL: PAINLEVEL: NO PAIN (0)

## 2018-02-28 NOTE — MR AVS SNAPSHOT
After Visit Summary   2/28/2018    Annette Layton    MRN: [id number]           Patient Information     Date Of Birth          1945        Visit Information        Provider Department      2/28/2018 11:30 AM Harman Hogan MD [facility]        Today's Diagnoses     Hypothyroidism, unspecified type    -  1    Primary osteoarthritis of both knees        Chronic low back pain, unspecified back pain laterality, with sciatica presence unspecified        Anxiety        Polyneuropathy        Other chronic pain        Urinary incontinence, unspecified type           Follow-ups after your visit        Additional Services     PHYSICAL THERAPY REFERRAL       [facility] pool therapy                  Your next 10 appointments already scheduled     May 22, 2018  2:30 PM CDT   (Arrive by 2:15 PM)   RETURN ENDOCRINE with Georgiana Norman MD   [facility] Endocrinology ([facility])    [address] [place] [id number] [phone]              Who to contact     If you have questions or need follow up information about today's clinic visit or your schedule please contact [facility] directly at [phone].  Normal or non-critical lab and imaging results will be communicated to you by MyChart, letter or phone within 4 business days after the clinic has received the results. If you do not hear from us within 7 days, please contact the clinic through MyChart or phone. If you have a critical or abnormal lab result, we will notify you by phone as soon as possible.  Submit refill requests through [employer] or call your pharmacy and they will forward the refill request to us. Please allow 3 business days for your refill to be completed.          Additional Information About Your Visit        MyChart Information     [employer] gives you secure access to your electronic health record. If you see a primary care provider, you can  also send messages to your care team and make appointments. If you have questions, please call your primary care clinic.  If you do not have a primary care provider, please call [phone] and they will assist you.        Care EveryWhere ID     This is your Care EveryWhere ID. This could be used by other organizations to access your [place] medical records  [id number]        Your Vitals Were     Pulse Breastfeeding?                92 No           Blood Pressure from Last 3 Encounters:   02/28/18 (!) 156/94   06/14/17 (!) 159/102   04/19/17 (!) 174/100    Weight from Last 3 Encounters:   09/02/15 168 lb 12.8 oz (76.6 kg)   10/29/13 149 lb 6.4 oz (67.8 kg)   10/08/13 145 lb 6.4 oz (66 kg)              We Performed the Following     PHYSICAL THERAPY REFERRAL          Today's Medication Changes          These changes are accurate as of 2/28/18  3:21 PM.  If you have any questions, ask your nurse or doctor.               These medicines have changed or have updated prescriptions.        Dose/Directions    acetaminophen-codeine 300-30 MG per tablet   Commonly known as:  TYLENOL #3   This may have changed:    - when to take this  - reasons to take this   Used for:  Other chronic pain   Changed by:  Harman Hogan MD        Dose:  1 tablet   Take 1 tablet by mouth every 6 hours as needed for moderate pain   Quantity:  120 tablet   Refills:  5       gabapentin 300 MG capsule   Commonly known as:  NEURONTIN   This may have changed:  See the new instructions.   Used for:  Polyneuropathy   Changed by:  Harman Hogan MD        Dose:  600 mg   Take 2 capsules (600 mg) by mouth 2 times daily   Quantity:  360 capsule   Refills:  11       * venlafaxine 150 MG 24 hr capsule   Commonly known as:  EFFEXOR-XR   This may have changed:    - Another medication with the same name was added. Make sure you understand how and when to take each.  - Another medication with the same name was changed. Make sure you understand how and  when to take each.   Changed by:  Harman Hogan MD        TAKE 1 CAPSULE BY MOUTH DAILY ALONG WITH A 75MG CAPSULE FOR A TOTAL  MG DAILY TAKE WITH FOOD   Refills:  0       * venlafaxine 75 MG 24 hr capsule   Commonly known as:  EFFEXOR-XR   This may have changed:  See the new instructions.   Used for:  Anxiety, Polyneuropathy   Changed by:  Harman Hogan MD        Dose:  75 mg   Take 1 capsule (75 mg) by mouth daily Take with 150 mg daily for a total dose of 225 mg daily.   Quantity:  90 capsule   Refills:  11       * venlafaxine 150 MG 24 hr capsule   Commonly known as:  EFFEXOR-XR   This may have changed:  You were already taking a medication with the same name, and this prescription was added. Make sure you understand how and when to take each.   Used for:  Anxiety, Polyneuropathy   Changed by:  Harman Hogan MD        Dose:  150 mg   Take 1 capsule (150 mg) by mouth daily   Quantity:  90 capsule   Refills:  3       * Notice:  This list has 3 medication(s) that are the same as other medications prescribed for you. Read the directions carefully, and ask your doctor or other care provider to review them with you.         Where to get your medicines      These medications were sent to [id number] - [place], [place] - [address] [facility] [address]     Phone:  [phone]     gabapentin 300 MG capsule    venlafaxine 150 MG 24 hr capsule    venlafaxine 75 MG 24 hr capsule         Some of these will need a paper prescription and others can be bought over the counter.  Ask your nurse if you have questions.     Bring a paper prescription for each of these medications     acetaminophen-codeine 300-30 MG per tablet                Primary Care Provider Office Phone # Fax #    Harman Hogan [phone]       [address] [facility] [address]        Equal Access to Services     ALVARADO MESSINA AH: Olivia simpson  Radhaalex, mohitda luqadaha, qadanielta kadonald jiménez, anna galaviz misbahlilly carrillorobert bernice. So [facility] [phone].    ATENCIÓN: Si pola anton, tiene a strauss disposición servicios gratuitos de asistencia lingüística. Francisca al [phone].    We comply with applicable federal civil rights laws and [place] laws. We do not discriminate on the basis of race, color, national origin, age, disability, sex, sexual orientation, or gender identity.            Thank you!     Thank you for choosing [facility] AND [facility]  for your care. Our goal is always to provide you with excellent care. Hearing back from our patients is one way we can continue to improve our services. Please take a few minutes to complete the written survey that you may receive in the mail after your visit with us. Thank you!             Your Updated Medication List - Protect others around you: Learn how to safely use, store and throw away your medicines at www.disposemymeds.org.          This list is accurate as of 2/28/18  3:21 PM.  Always use your most recent med list.                   Brand Name Dispense Instructions for use Diagnosis    acetaminophen-codeine 300-30 MG per tablet    TYLENOL #3    120 tablet    Take 1 tablet by mouth every 6 hours as needed for moderate pain    Other chronic pain       gabapentin 300 MG capsule    NEURONTIN    360 capsule    Take 2 capsules (600 mg) by mouth 2 times daily    Polyneuropathy       ketoconazole 2 % cream    NIZORAL          penicillin V potassium 250 MG tablet    VEETID     Take 250 mg by mouth        valACYclovir 1000 mg tablet    VALTREX          * venlafaxine 150 MG 24 hr capsule    EFFEXOR-XR     TAKE 1 CAPSULE BY MOUTH DAILY ALONG WITH A 75MG CAPSULE FOR A TOTAL  MG DAILY TAKE WITH FOOD        * venlafaxine 75 MG 24 hr capsule    EFFEXOR-XR    90 capsule    Take 1 capsule (75 mg) by mouth daily Take with 150 mg daily for a total dose of 225 mg daily.    Anxiety, Polyneuropathy        * venlafaxine 150 MG 24 hr capsule    EFFEXOR-XR    90 capsule    Take 1 capsule (150 mg) by mouth daily    Anxiety, Polyneuropathy       * Notice:  This list has 3 medication(s) that are the same as other medications prescribed for you. Read the directions carefully, and ask your doctor or other care provider to review them with you.

## 2018-02-28 NOTE — PROGRESS NOTES
Nursing Notes:   Ofe Zhao LPN  2/28/2018 12:06 PM  Signed  She is here today to follow up after having lab work.  Ofe Zhao LPN..................2/28/2018   11:55 AM      SUBJECTIVE:  Annette Layton  is a 72 year old female who comes in today for follow-up and to review laboratory results.  I have not seen her since last spring.  We referred her to endocrinology.  They treated her empirically with methimazole 5 mg daily and recommended repeat thyroid function tests a month later.  She spoke with the endocrinologist yesterday.  She had a suppressed TSH over 3 years and was started on the methimazole in June of last year.  They repeated her TSH and free T4 and they elected to hold her methimazole for a month, after her free T4 was suppressed.    She feels that she has gained 30# and she is sleeping a lot. She is going to basketball games but not doing much else.     She finished a book at [place]. She has a play that is being written that will be at the [facility] in August.     She has some urinary leakage.  She not interested in doing anything at this point.     Results for orders placed or performed in visit on 02/26/18   T4, free   Result Value Ref Range    T4 Free 0.57 (L) 0.60 - 1.60 ng/dL   Thyrotropin GH   Result Value Ref Range    Thyrotropin 2.18 0.34 - 5.60 IU/mL        Past Medical, Family, and Social History reviewed and updated as noted below.   ROS is negative except as noted above       Allergies   Allergen Reactions     Hydrocodone Nausea and Vomiting     Penicillins      Liquid Penicillins     , History reviewed. No pertinent family history.,   Current Outpatient Prescriptions   Medication     acetaminophen-codeine (TYLENOL #3) 300-30 MG per tablet     gabapentin (NEURONTIN) 300 MG capsule     venlafaxine (EFFEXOR-XR) 75 MG 24 hr capsule     venlafaxine (EFFEXOR-XR) 150 MG 24 hr capsule     ketoconazole (NIZORAL) 2 % cream     penicillin V potassium (VEETID) 250 MG tablet      valACYclovir (VALTREX) 1000 mg tablet     venlafaxine (EFFEXOR-XR) 150 MG 24 hr capsule     [DISCONTINUED] gabapentin (NEURONTIN) 300 MG capsule     [DISCONTINUED] venlafaxine (EFFEXOR-XR) 75 MG 24 hr capsule     No current facility-administered medications for this visit.    ,   Past Medical History:   Diagnosis Date     Hypothyroidism    ,   Patient Active Problem List    Diagnosis Date Noted     Primary osteoarthritis of both knees 02/28/2018     Priority: Medium     Chronic low back pain, unspecified back pain laterality, with sciatica presence unspecified 02/28/2018     Priority: Medium     Anxiety 02/28/2018     Priority: Medium     Polyneuropathy 02/28/2018     Priority: Medium     Urinary incontinence, unspecified type 02/28/2018     Priority: Medium     Other chronic pain 02/28/2018     Priority: Medium     Abnormal TSH 02/16/2018     Priority: Medium     Hypothyroidism 06/21/2017     Priority: Medium   ,   Past Surgical History:   Procedure Laterality Date     hemithyroidectomy  1979     HYSTERECTOMY  1989    and   Social History   Substance Use Topics     Smoking status: Current Every Day Smoker     Smokeless tobacco: Never Used     Alcohol use Not on file     OBJECTIVE:  BP (!) 156/94 (BP Location: Right arm, Patient Position: Sitting, Cuff Size: Adult Large)  Pulse 92  Breastfeeding? No   EXAM:  Alert cooperative, no distress.  Lungs are clear, no rales rhonchi or wheezes are heard.  Cardiac RRR without murmur.  She does smell of smoke.  Would not let us weigh her today.  Affect appears broad ranging and appropriate.  She has chronic degenerative changes noted in her knees.  Decreased range of motion of her lumbar spine.  ASSESSMENT/Plan :    Annette was seen today for recheck.    Diagnoses and all orders for this visit:    Hypothyroidism, unspecified type    Primary osteoarthritis of both knees  -     PHYSICAL THERAPY REFERRAL    Chronic low back pain, unspecified back pain laterality, with sciatica  presence unspecified  -     PHYSICAL THERAPY REFERRAL    Anxiety  -     venlafaxine (EFFEXOR-XR) 75 MG 24 hr capsule; Take 1 capsule (75 mg) by mouth daily Take with 150 mg daily for a total dose of 225 mg daily.  -     venlafaxine (EFFEXOR-XR) 150 MG 24 hr capsule; Take 1 capsule (150 mg) by mouth daily    Polyneuropathy  -     gabapentin (NEURONTIN) 300 MG capsule; Take 2 capsules (600 mg) by mouth 2 times daily  -     venlafaxine (EFFEXOR-XR) 75 MG 24 hr capsule; Take 1 capsule (75 mg) by mouth daily Take with 150 mg daily for a total dose of 225 mg daily.  -     PHYSICAL THERAPY REFERRAL  -     venlafaxine (EFFEXOR-XR) 150 MG 24 hr capsule; Take 1 capsule (150 mg) by mouth daily    Other chronic pain  -     acetaminophen-codeine (TYLENOL #3) 300-30 MG per tablet; Take 1 tablet by mouth every 6 hours as needed for moderate pain  -     PHYSICAL THERAPY REFERRAL    Urinary incontinence, unspecified type      Reviewed her thyroid testing and recommend she follow through with repeat testing in a month after being off the methimazole as recommended by her endocrinologist.    Renewal on Effexor XR at the same dose.  Renal on gabapentin 600 mg twice daily for her polyneuropathy.  Renewal on Tylenol 3 for arthritis pain.  Lengthy discussion today with regard to consideration of pool based therapy as a step to try and improve her stamina, fitness, and balance and endurance.  She may be interested in pursuing that so referral sent to [facility].    For her mixed incontinence, offered referral to urology but she declines.  She would not be a good candidate for medication as she already has a dry mouth and tiredness.    A total of 25 minutes was spent withthe patient, greater than 50% of the time was spent in counseling/discussion of the aforementioned concerns.     Harman Hogan MD

## 2018-02-28 NOTE — NURSING NOTE
She is here today to follow up after having lab work.  Ofe Zhao LPN..................2/28/2018   11:55 AM

## 2018-03-01 ASSESSMENT — PATIENT HEALTH QUESTIONNAIRE - PHQ9: SUM OF ALL RESPONSES TO PHQ QUESTIONS 1-9: 14

## 2018-03-13 DIAGNOSIS — G89.29 OTHER CHRONIC PAIN: ICD-10-CM

## 2018-03-16 NOTE — TELEPHONE ENCOUNTER
Redundant Refill Request for Tylenol #3 refused;           Disp Refills Start End AMNA   acetaminophen-codeine (TYLENOL #3) 300-30 MG per tablet 120 tablet 5 2/28/2018  No   Sig: Take 1 tablet by mouth every 6 hours as needed for moderate pain   Class: Local Print   Route: Oral   Order: [id number]     Unable to complete prescription refill per RN Medication Refill Policy. Joon Erwin 3/16/2018 2:48 PM

## 2018-05-22 ENCOUNTER — MEDICAL CORRESPONDENCE (OUTPATIENT)
Dept: HEALTH INFORMATION MANAGEMENT | Facility: CLINIC | Age: 73
End: 2018-05-22

## 2018-06-10 DIAGNOSIS — I06.1 RHEUMATIC AORTIC INSUFFICIENCY: Primary | ICD-10-CM

## 2018-06-12 NOTE — TELEPHONE ENCOUNTER
Penicillin V Potassium        Last Written Prescription Date: 12/13/17 as per Care Everywhere  Last Fill Quantity: 180 tabs, # refills: 1 as per care everywhere  Last Office Visit: 2/28/18 with PCP as per chart review  Future Office visit: None scheduled at this time      Routing refill request to provider for review/approval because:  Drug not on the FM, P or  Health refill protocol or controlled substance    Rx as requested was noted in office visit notes with PCP on 2/28/18. No changes noted and no follow up timeline is specified. PCP is out of the office until 6/19/18. Writer will esme up and route Rx request to PCP's teamlet for his consideration/approval in the absence of PCP.    Unable to complete prescription refill per RN Medication Refill Policy. Joon Erwin 6/12/2018 4:54 PM

## 2018-06-13 RX ORDER — PENICILLIN V POTASSIUM 250 MG/1
TABLET, FILM COATED ORAL
Qty: 180 TABLET | Refills: 2 | Status: SHIPPED | OUTPATIENT
Start: 2018-06-13 | End: 2018-09-05

## 2018-08-31 ENCOUNTER — MYC MEDICAL ADVICE (OUTPATIENT)
Dept: FAMILY MEDICINE | Facility: OTHER | Age: 73
End: 2018-08-31

## 2018-08-31 DIAGNOSIS — G89.29 OTHER CHRONIC PAIN: ICD-10-CM

## 2018-08-31 NOTE — TELEPHONE ENCOUNTER
Writer notes MyChart message as per patient. Attempted to contact patient to discuss her symptoms as noted as well as to relay that PCP is out of the office until 9/4/18 but number as listed was not a functioning number. Writer will esme up and route Rx request for Tylenol #3 to PCP for his consideration/approval as per patient Rx is not due for a refill until 9/6/18. Writer will also respond to patient about all of her concerns via MyChart message. See MyChart message.    Joon Erwin RN on 8/31/2018 at 2:54 PM

## 2018-09-05 ENCOUNTER — OFFICE VISIT (OUTPATIENT)
Dept: FAMILY MEDICINE | Facility: OTHER | Age: 73
End: 2018-09-05
Attending: NURSE PRACTITIONER
Payer: MEDICARE

## 2018-09-05 ENCOUNTER — HOSPITAL ENCOUNTER (OUTPATIENT)
Dept: GENERAL RADIOLOGY | Facility: OTHER | Age: 73
Discharge: HOME | End: 2018-09-05
Attending: NURSE PRACTITIONER | Admitting: NURSE PRACTITIONER
Payer: MEDICARE

## 2018-09-05 ENCOUNTER — TELEPHONE (OUTPATIENT)
Dept: FAMILY MEDICINE | Facility: OTHER | Age: 73
End: 2018-09-05

## 2018-09-05 VITALS
DIASTOLIC BLOOD PRESSURE: 96 MMHG | SYSTOLIC BLOOD PRESSURE: 154 MMHG | OXYGEN SATURATION: 95 % | TEMPERATURE: 98.8 F | HEART RATE: 88 BPM

## 2018-09-05 DIAGNOSIS — B00.9 HSV (HERPES SIMPLEX VIRUS) INFECTION: Primary | ICD-10-CM

## 2018-09-05 DIAGNOSIS — J02.9 VIRAL PHARYNGITIS: ICD-10-CM

## 2018-09-05 DIAGNOSIS — R05.8 NON-PRODUCTIVE COUGH: ICD-10-CM

## 2018-09-05 DIAGNOSIS — G89.29 OTHER CHRONIC PAIN: ICD-10-CM

## 2018-09-05 DIAGNOSIS — R06.02 SHORTNESS OF BREATH: ICD-10-CM

## 2018-09-05 DIAGNOSIS — J22 ACUTE LOWER RESPIRATORY TRACT INFECTION: Primary | ICD-10-CM

## 2018-09-05 PROCEDURE — G0463 HOSPITAL OUTPT CLINIC VISIT: HCPCS | Mod: 25

## 2018-09-05 PROCEDURE — G0463 HOSPITAL OUTPT CLINIC VISIT: HCPCS

## 2018-09-05 PROCEDURE — 99214 OFFICE O/P EST MOD 30 MIN: CPT | Performed by: NURSE PRACTITIONER

## 2018-09-05 PROCEDURE — 71046 X-RAY EXAM CHEST 2 VIEWS: CPT

## 2018-09-05 RX ORDER — AZITHROMYCIN 250 MG/1
TABLET, FILM COATED ORAL
Qty: 6 TABLET | Refills: 0 | Status: SHIPPED | OUTPATIENT
Start: 2018-09-05 | End: 2019-05-06

## 2018-09-05 RX ORDER — BENZONATATE 100 MG/1
100 CAPSULE ORAL 3 TIMES DAILY PRN
Qty: 42 CAPSULE | Refills: 0 | Status: SHIPPED | OUTPATIENT
Start: 2018-09-05 | End: 2019-05-06

## 2018-09-05 ASSESSMENT — PAIN SCALES - GENERAL: PAINLEVEL: NO PAIN (0)

## 2018-09-05 NOTE — MR AVS SNAPSHOT
After Visit Summary   9/5/2018    Annette Layton    MRN: [id number]           Patient Information     Date Of Birth          1945        Visit Information        Provider Department      9/5/2018 2:15 PM Ro Ramirez NP [facility]        Today's Diagnoses     Acute lower respiratory tract infection    -  1    Non-productive cough        Shortness of breath        Viral pharyngitis          Care Instructions    Azithromycin daily x 5 days     Tessalon up to 3 times per day as needed for cough     Encouraged fluids and rest.    May use symptomatic care with tylenol or ibuprofen.     Using a humidifier works well to break up the congestion.     Elevate the mattress to 15 degrees in order to help with the congestion.    Frequent swallows of cool liquid.      Oatmeal or honey coats the throat and some patients find it soothes the pain.     Salt water gargles as needed     Return to clinic with change/worsening of symptoms or concerns.          Follow-ups after your visit        Future tests that were ordered for you today     Open Future Orders        Priority Expected Expires Ordered    XR Chest 2 Views Routine 9/5/2018 9/5/2019 9/5/2018            Who to contact     If you have questions or need follow up information about today's clinic visit or your schedule please contact [facility] directly at [phone].  Normal or non-critical lab and imaging results will be communicated to you by YingYanghart, letter or phone within 4 business days after the clinic has received the results. If you do not hear from us within 7 days, please contact the clinic through YingYanghart or phone. If you have a critical or abnormal lab result, we will notify you by phone as soon as possible.  Submit refill requests through ScaleArc or call your pharmacy and they will forward the refill request to us. Please allow 3 business days for your refill to be completed.           Additional Information About Your Visit        Celletrahart Information     [facility] at [facility] gives you secure access to your electronic health record. If you see a primary care provider, you can also send messages to your care team and make appointments. If you have questions, please call your primary care clinic.  If you do not have a primary care provider, please call [phone] and they will assist you.        Care EveryWhere ID     This is your Care EveryWhere ID. This could be used by other organizations to access your [facility] medical records  [id number]        Your Vitals Were     Pulse Temperature Pulse Oximetry Breastfeeding?          88 98.8  F (37.1  C) (Tympanic) 95% No         Blood Pressure from Last 3 Encounters:   09/05/18 (!) 154/96   02/28/18 (!) 156/94   06/14/17 (!) 159/102    Weight from Last 3 Encounters:   09/02/15 168 lb 12.8 oz (76.6 kg)   10/29/13 149 lb 6.4 oz (67.8 kg)   10/08/13 145 lb 6.4 oz (66 kg)                 Today's Medication Changes          These changes are accurate as of 9/5/18  2:52 PM.  If you have any questions, ask your nurse or doctor.               Start taking these medicines.        Dose/Directions    azithromycin 250 MG tablet   Commonly known as:  ZITHROMAX   Used for:  Acute lower respiratory tract infection   Started by:  Ro Ramirez NP        Two tablets first day, then one tablet daily for four days.   Quantity:  6 tablet   Refills:  0       benzonatate 100 MG capsule   Commonly known as:  TESSALON   Used for:  Non-productive cough   Started by:  Ro Ramirez NP        Dose:  100 mg   Take 1 capsule (100 mg) by mouth 3 times daily as needed for cough   Quantity:  42 capsule   Refills:  0            Where to get your medicines      These medications were sent to [id number] - [place], [place] - [address] [facility] [address]     Phone:  [phone]     azithromycin 250 MG tablet    benzonatate  100 MG capsule                Primary Care Provider Office Phone # Fax #    Harman WINN MD [place] [phone] [address]   [place] [address]        Equal Access to Services     TIANNAZOE SIDDHARTH : Hadii artie nugent huberto Gely, waaxda luqadaha, qaybta kaalmada tino, anna morse laEdmarsaqib queen. So [facility] [phone].    ATENCIÓN: Si habla español, tiene a strauss disposición servicios gratuitos de asistencia lingüística. Llame al [phone].    We comply with applicable federal civil rights laws and [place] laws. We do not discriminate on the basis of race, color, national origin, age, disability, sex, sexual orientation, or gender identity.            Thank you!     Thank you for choosing [facility]  for your care. Our goal is always to provide you with excellent care. Hearing back from our patients is one way we can continue to improve our services. Please take a few minutes to complete the written survey that you may receive in the mail after your visit with us. Thank you!             Your Updated Medication List - Protect others around you: Learn how to safely use, store and throw away your medicines at www.disposemymeds.org.          This list is accurate as of 9/5/18  2:52 PM.  Always use your most recent med list.                   Brand Name Dispense Instructions for use Diagnosis    acetaminophen-codeine 300-30 MG per tablet    TYLENOL #3    120 tablet    Take 1 tablet by mouth every 6 hours as needed for moderate pain    Other chronic pain       azithromycin 250 MG tablet    ZITHROMAX    6 tablet    Two tablets first day, then one tablet daily for four days.    Acute lower respiratory tract infection       benzonatate 100 MG capsule    TESSALON    42 capsule    Take 1 capsule (100 mg) by mouth 3 times daily as needed for cough    Non-productive cough       gabapentin 300 MG capsule    NEURONTIN    360 capsule    Take 2 capsules (600 mg) by  mouth 2 times daily    Polyneuropathy       ketoconazole 2 % cream    NIZORAL          valACYclovir 1000 mg tablet    VALTREX          * venlafaxine 75 MG 24 hr capsule    EFFEXOR-XR    90 capsule    Take 1 capsule (75 mg) by mouth daily Take with 150 mg daily for a total dose of 225 mg daily.    Anxiety, Polyneuropathy       * venlafaxine 150 MG 24 hr capsule    EFFEXOR-XR    90 capsule    Take 1 capsule (150 mg) by mouth daily    Anxiety, Polyneuropathy       * Notice:  This list has 2 medication(s) that are the same as other medications prescribed for you. Read the directions carefully, and ask your doctor or other care provider to review them with you.

## 2018-09-05 NOTE — NURSING NOTE
Patient presents with cough occasionally productive for 10 days. Patient states it started with aches and pains. Lynda Noriega LPN .............9/5/2018  1:49 PM

## 2018-09-05 NOTE — TELEPHONE ENCOUNTER
Patient is requesting to get a refill for her Tylenol #3. Patient states she has 4 left but is do for a 3 month refill. Patient would like [facility] for a pharmacy. Lynda Noriega LPN .............9/5/2018  1:53 PM

## 2018-09-05 NOTE — PATIENT INSTRUCTIONS
Azithromycin daily x 5 days     Tessalon up to 3 times per day as needed for cough     Encouraged fluids and rest.    May use symptomatic care with tylenol or ibuprofen.     Using a humidifier works well to break up the congestion.     Elevate the mattress to 15 degrees in order to help with the congestion.    Frequent swallows of cool liquid.      Oatmeal or honey coats the throat and some patients find it soothes the pain.     Salt water gargles as needed     Return to clinic with change/worsening of symptoms or concerns.

## 2018-09-05 NOTE — PROGRESS NOTES
HPI:    Annette Layton is a 72 year old female  who presents to clinic today for URI.    Started with body aches 10 days, slowing improving.  Coughing for the past 8 days.  Hard coughing fits and feels like she is going to vomit from coughing so hard.  Non productive cough.  No chest tightness or heaviness.  Feeling short of breath with activity.  Wheezing with laying flat.  No documented fevers but last week she had goose bumps on arms and sweating on face.  Sore throat x 6 days ago, persisting but improving some.  No headaches.  Appetite decreased some, drinking fluids.  Energy decreased, low, worn out.    Daily smoker 1 ppd.  No inhaler use.    Tried Chloraseptic spray yesterday.  Denies taking any OTC cough/cold medications.      Past Medical History:   Diagnosis Date     Hypothyroidism      Past Surgical History:   Procedure Laterality Date     hemithyroidectomy  1979     HYSTERECTOMY  1989     Social History   Substance Use Topics     Smoking status: Current Every Day Smoker     Packs/day: 1.00     Smokeless tobacco: Never Used     Alcohol use No     Current Outpatient Prescriptions   Medication Sig Dispense Refill     acetaminophen-codeine (TYLENOL #3) 300-30 MG per tablet Take 1 tablet by mouth every 6 hours as needed for moderate pain 120 tablet 5     gabapentin (NEURONTIN) 300 MG capsule Take 2 capsules (600 mg) by mouth 2 times daily 360 capsule 11     ketoconazole (NIZORAL) 2 % cream        valACYclovir (VALTREX) 1000 mg tablet        venlafaxine (EFFEXOR-XR) 150 MG 24 hr capsule Take 1 capsule (150 mg) by mouth daily 90 capsule 3     venlafaxine (EFFEXOR-XR) 75 MG 24 hr capsule Take 1 capsule (75 mg) by mouth daily Take with 150 mg daily for a total dose of 225 mg daily. 90 capsule 11     [DISCONTINUED] venlafaxine (EFFEXOR-XR) 150 MG 24 hr capsule TAKE 1 CAPSULE BY MOUTH DAILY ALONG WITH A 75MG CAPSULE FOR A TOTAL  MG DAILY TAKE WITH FOOD       Allergies   Allergen Reactions     Hydrocodone  Nausea and Vomiting     Penicillins      Liquid Penicillins           Past medical history, past surgical history, current medications and allergies reviewed and accurate to the best of my knowledge.        ROS:  Refer to HPI    /90 (BP Location: Right arm, Patient Position: Sitting, Cuff Size: Adult Regular)  Pulse 88  Temp 98.8  F (37.1  C) (Tympanic)  SpO2 95%  Breastfeeding? No    EXAM:  General Appearance: Well appearing elderly female, appropriate appearance for age. No acute distress  Head: normocephalic, atraumatic  Ears: Left TM grey, translucent with bony landmarks appreciated, no erythema, no effusion, no bulging, no purulence.  Right TM grey, translucent with bony landmarks appreciated, no erythema, no effusion, no bulging, no purulence.  Left auditory canal clear.  Right auditory canal clear.  Normal external ears, non tender.  Eyes: conjunctivae normal without erythema or irritation, no drainage or crusting, no eyelid swelling, pupils equal   Orophayrnx: moist mucous membranes, posterior pharynx without erythema, tonsils without hypertrophy, no erythema, no exudates or petechiae, no ulcers, no post nasal drip seen, no trismus.    Neck: supple without adenopathy  Respiratory: normal chest wall and respirations.  Normal effort.  Clear to auscultation bilaterally, no wheezing, crackles or rhonchi.  No increased work of breathing.  No cough appreciated, oxygen saturation 95%  Cardiac: RRR with no murmurs  Musculoskeletal:  Normal gait.  Equal movement of bilateral upper extremities.  Equal movement of bilateral lower extremities.    Dermatological: no rashes noted of exposed skin  Psychological: normal affect, alert and pleasant        Xray:  PROCEDURE: XR CHEST 2 VW 9/5/2018 2:20 PM    HISTORY: ; Non-productive cough; Shortness of breath    COMPARISONS: None.    TECHNIQUE: Views.    FINDINGS: Heart is fairly normal in size. Atherosclerotic changes seen  within the aorta. Right lung is clear.  There is no pleural effusion.    There is some linear scar or atelectasis at the left lung base.    Mild degenerative changes seen in the spine. There is a S-shaped  scoliosis.                Impression             IMPRESSION: Scar or atelectasis at the left lung base.    IVANA KENT MD            ASSESSMENT/PLAN:    ICD-10-CM    1. Acute lower respiratory tract infection J22 azithromycin (ZITHROMAX) 250 MG tablet   2. Non-productive cough R05 XR Chest 2 Views     benzonatate (TESSALON) 100 MG capsule   3. Shortness of breath R06.02 XR Chest 2 Views   4. Viral pharyngitis J02.9          CXR completed and reviewed, appears to have lower lobe congestion bilaterally, radiologist over read:  Scar or atelectasis at the left lung base.    Azithromycin daily x 5 days (z dontrell dosing).    Tessalon 100 mg TID PRN     Encouraged fluids  Symptomatic treatment - salt water gargles, honey, elevation, humidifier,lozenges, etc     Discussed warning signs/symptoms indicative of need to f/u    Follow up if symptoms persist or worsen or concerns

## 2018-09-10 RX ORDER — VALACYCLOVIR HYDROCHLORIDE 1 G/1
TABLET, FILM COATED ORAL
Qty: 8 TABLET | Refills: 3 | Status: SHIPPED | OUTPATIENT
Start: 2018-09-10 | End: 2019-10-30

## 2018-09-10 NOTE — TELEPHONE ENCOUNTER
RN refill protocol fails for Valtrex as requested as noted below:     Normal serum creatinine on file in past 12 months           Recent Labs   Lab Test  10/26/16   1018   CR  0.91              Tylenol #3 is also noted to be last filled as noted below:    Medication Detail      Disp Refills Start End AMNA   acetaminophen-codeine (TYLENOL #3) 300-30 MG per tablet 120 tablet 5 9/4/2018  No   Sig - Route: Take 1 tablet by mouth every 6 hours as needed for moderate pain - Oral   Class: Local Print   Order: [id number]     PCP is out of the office until 9/12/18. LOV with PCP was on 2/28/18. Both Rxs as requested were noted in office visit notes on that date with no changes and no specific follow up timeline. As Tylenol #3 was just filled as noted above, writer will refuse that Rx request at this time. Will esme up and route Rx request for Valtrex to PCP's teamlet for his consideration/approval in the absence of PCP.    Unable to complete prescription refill per RN Medication Refill Policy. Joon Erwin 9/10/2018 9:01 AM

## 2019-02-26 DIAGNOSIS — I06.1 RHEUMATIC AORTIC INSUFFICIENCY: ICD-10-CM

## 2019-02-26 DIAGNOSIS — G62.9 POLYNEUROPATHY: ICD-10-CM

## 2019-02-26 DIAGNOSIS — G89.29 OTHER CHRONIC PAIN: ICD-10-CM

## 2019-02-26 DIAGNOSIS — F41.9 ANXIETY: ICD-10-CM

## 2019-02-26 NOTE — LETTER
February 27, 2019      Annette Layton  [address] [address]        Dear Annette,       This is to remind you that you are due for your annual appointment with Harman Hogan. Your last visit was on 2/28/18. Additional refills of your medication require you to complete this visit.    Please call [phone] to schedule your appointment.    Thank you for choosing [facility] and [facility] for your health care needs.    Sincerely,      Refill RN  [facility]

## 2019-02-27 RX ORDER — PENICILLIN V POTASSIUM 250 MG/1
TABLET, FILM COATED ORAL
Qty: 180 TABLET | Refills: 3 | Status: SHIPPED | OUTPATIENT
Start: 2019-02-27 | End: 2020-02-19

## 2019-02-27 RX ORDER — VENLAFAXINE HYDROCHLORIDE 150 MG/1
CAPSULE, EXTENDED RELEASE ORAL
Qty: 90 CAPSULE | Refills: 3 | Status: SHIPPED | OUTPATIENT
Start: 2019-02-27 | End: 2019-10-30

## 2019-02-27 NOTE — TELEPHONE ENCOUNTER
RN refill protocol fails for both Rxs as requested. Writer has already sent patient a reminder letter about need for an appointment with PCP with other refill encounter as dated today. Writer will esme up and route Rx request to PCP for his consideration/approval.    Unable to complete prescription refill per RN Medication Refill Policy. Joon Erwin 2/27/2019 3:30 PM

## 2019-02-27 NOTE — TELEPHONE ENCOUNTER
Chart review shows that Rx as requested is noted to be discontinued as noted below:    Outpatient Medication Detail      Disp Refills Start End AMNA   penicillin V potassium (VEETID) 250 MG tablet (Discontinued) 180 tablet 2 6/13/2018 9/5/2018 No   Sig: TAKE 1 TABLET BY MOUTH TWICE DAILY BEFORE MEALS   Sent to pharmacy as: penicillin V potassium (VEETID) 250 MG tablet   Class: E-Prescribe   Reason for Discontinue: Not filled/taken by Patient   Order: [id number]   E-Prescribing Status: Receipt confirmed by pharmacy (6/13/2018 10:16 AM CDT)   Printout Tracking     External Result Report   Pharmacy     [id number] - [place], [place] - [address]   This Order Has Been Discontinued     Order Status Reason By On   Discontinued Not filled/taken by Patient Lynda Noriega, LPN 9/5/18 2475     Writer also notes that patient is due for an annual exam with PCP. Call placed to patient to discuss. Patient was not available at this time. Writer will esme up and route Rx request to PCP for his consideration/approval as well as will send patient a reminder letter.    Unable to complete prescription refill per RN Medication Refill Policy. Joon Erwin 2/27/2019 3:23 PM

## 2019-03-07 ENCOUNTER — DOCUMENTATION ONLY (OUTPATIENT)
Dept: OTHER | Facility: CLINIC | Age: 74
End: 2019-03-07

## 2019-05-02 DIAGNOSIS — G62.9 POLYNEUROPATHY: ICD-10-CM

## 2019-05-06 ENCOUNTER — OFFICE VISIT (OUTPATIENT)
Dept: FAMILY MEDICINE | Facility: OTHER | Age: 74
End: 2019-05-06
Attending: FAMILY MEDICINE
Payer: MEDICARE

## 2019-05-06 ENCOUNTER — MEDICAL CORRESPONDENCE (OUTPATIENT)
Dept: HEALTH INFORMATION MANAGEMENT | Facility: OTHER | Age: 74
End: 2019-05-06

## 2019-05-06 VITALS
TEMPERATURE: 97.1 F | WEIGHT: 182 LBS | RESPIRATION RATE: 18 BRPM | BODY MASS INDEX: 30.32 KG/M2 | DIASTOLIC BLOOD PRESSURE: 94 MMHG | HEART RATE: 72 BPM | SYSTOLIC BLOOD PRESSURE: 174 MMHG | HEIGHT: 65 IN

## 2019-05-06 DIAGNOSIS — Z23 NEED FOR DIPHTHERIA-TETANUS-PERTUSSIS (TDAP) VACCINE: ICD-10-CM

## 2019-05-06 DIAGNOSIS — R53.82 CHRONIC FATIGUE: ICD-10-CM

## 2019-05-06 DIAGNOSIS — Z72.0 TOBACCO ABUSE: ICD-10-CM

## 2019-05-06 DIAGNOSIS — Z12.31 VISIT FOR SCREENING MAMMOGRAM: ICD-10-CM
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
## 2019-05-06 DIAGNOSIS — G62.9 POLYNEUROPATHY: ICD-10-CM

## 2019-05-06 DIAGNOSIS — Z23 NEED FOR VACCINATION AGAINST STREPTOCOCCUS PNEUMONIAE: ICD-10-CM

## 2019-05-06 DIAGNOSIS — E03.9 HYPOTHYROIDISM, UNSPECIFIED TYPE: Primary | ICD-10-CM

## 2019-05-06 LAB
ALBUMIN SERPL-MCNC: 4 G/DL (ref 3.5–5.7)
ALP SERPL-CCNC: 67 U/L (ref 34–104)
ALT SERPL W P-5'-P-CCNC: 7 U/L (ref 7–52)
ANION GAP SERPL CALCULATED.3IONS-SCNC: 8 MMOL/L (ref 3–14)
AST SERPL W P-5'-P-CCNC: 10 U/L (ref 13–39)
BASOPHILS # BLD AUTO: 0 10E9/L (ref 0–0.2)
BASOPHILS NFR BLD AUTO: 0.5 %
BILIRUB SERPL-MCNC: 0.3 MG/DL (ref 0.3–1)
BUN SERPL-MCNC: 12 MG/DL (ref 7–25)
CALCIUM SERPL-MCNC: 9.5 MG/DL (ref 8.6–10.3)
CHLORIDE SERPL-SCNC: 104 MMOL/L (ref 98–107)
CO2 SERPL-SCNC: 27 MMOL/L (ref 21–31)
CREAT SERPL-MCNC: 1.01 MG/DL (ref 0.6–1.2)
DIFFERENTIAL METHOD BLD: NORMAL
EOSINOPHIL # BLD AUTO: 0.2 10E9/L (ref 0–0.7)
EOSINOPHIL NFR BLD AUTO: 2.3 %
ERYTHROCYTE [DISTWIDTH] IN BLOOD BY AUTOMATED COUNT: 13.7 % (ref 10–15)
GFR SERPL CREATININE-BSD FRML MDRD: 54 ML/MIN/{1.73_M2}
GLUCOSE SERPL-MCNC: 95 MG/DL (ref 70–105)
HCT VFR BLD AUTO: 42.5 % (ref 35–47)
HGB BLD-MCNC: 13.5 G/DL (ref 11.7–15.7)
IMM GRANULOCYTES # BLD: 0 10E9/L (ref 0–0.4)
IMM GRANULOCYTES NFR BLD: 0.2 %
LYMPHOCYTES # BLD AUTO: 2.3 10E9/L (ref 0.8–5.3)
LYMPHOCYTES NFR BLD AUTO: 28 %
MCH RBC QN AUTO: 29.7 PG (ref 26.5–33)
MCHC RBC AUTO-ENTMCNC: 31.8 G/DL (ref 31.5–36.5)
MCV RBC AUTO: 94 FL (ref 78–100)
MONOCYTES # BLD AUTO: 0.8 10E9/L (ref 0–1.3)
MONOCYTES NFR BLD AUTO: 9.1 %
NEUTROPHILS # BLD AUTO: 5 10E9/L (ref 1.6–8.3)
NEUTROPHILS NFR BLD AUTO: 59.9 %
PLATELET # BLD AUTO: 300 10E9/L (ref 150–450)
POTASSIUM SERPL-SCNC: 3.6 MMOL/L (ref 3.5–5.1)
PROT SERPL-MCNC: 7.3 G/DL (ref 6.4–8.9)
RBC # BLD AUTO: 4.54 10E12/L (ref 3.8–5.2)
SODIUM SERPL-SCNC: 139 MMOL/L (ref 134–144)
T3FREE SERPL-MCNC: 3.5 PG/ML (ref 2.5–3.9)
T4 FREE SERPL-MCNC: 0.63 NG/DL (ref 0.6–1.6)
TSH SERPL DL<=0.05 MIU/L-ACNC: <0.2 IU/ML (ref 0.34–5.6)
WBC # BLD AUTO: 8.3 10E9/L (ref 4–11)

## 2019-05-06 PROCEDURE — G0463 HOSPITAL OUTPT CLINIC VISIT: HCPCS

## 2019-05-06 PROCEDURE — 90471 IMMUNIZATION ADMIN: CPT

## 2019-05-06 PROCEDURE — 90715 TDAP VACCINE 7 YRS/> IM: CPT

## 2019-05-06 PROCEDURE — 90472 IMMUNIZATION ADMIN EACH ADD: CPT

## 2019-05-06 PROCEDURE — 90732 PPSV23 VACC 2 YRS+ SUBQ/IM: CPT

## 2019-05-06 PROCEDURE — 84481 FREE ASSAY (FT-3): CPT | Mod: ZL | Performed by: FAMILY MEDICINE

## 2019-05-06 PROCEDURE — G0463 HOSPITAL OUTPT CLINIC VISIT: HCPCS | Mod: 25

## 2019-05-06 PROCEDURE — 99214 OFFICE O/P EST MOD 30 MIN: CPT | Performed by: FAMILY MEDICINE

## 2019-05-06 PROCEDURE — 36415 COLL VENOUS BLD VENIPUNCTURE: CPT | Mod: ZL | Performed by: FAMILY MEDICINE

## 2019-05-06 PROCEDURE — 85025 COMPLETE CBC W/AUTO DIFF WBC: CPT | Mod: ZL | Performed by: FAMILY MEDICINE

## 2019-05-06 PROCEDURE — 80053 COMPREHEN METABOLIC PANEL: CPT | Mod: ZL | Performed by: FAMILY MEDICINE

## 2019-05-06 PROCEDURE — 84439 ASSAY OF FREE THYROXINE: CPT | Mod: ZL | Performed by: FAMILY MEDICINE

## 2019-05-06 PROCEDURE — 84443 ASSAY THYROID STIM HORMONE: CPT | Mod: ZL | Performed by: FAMILY MEDICINE

## 2019-05-06 PROCEDURE — G0009 ADMIN PNEUMOCOCCAL VACCINE: HCPCS

## 2019-05-06 RX ORDER — GABAPENTIN 300 MG/1
600 CAPSULE ORAL 2 TIMES DAILY
Qty: 360 CAPSULE | Refills: 11 | Status: SHIPPED | OUTPATIENT
Start: 2019-05-06 | End: 2020-01-01

## 2019-05-06 RX ORDER — GABAPENTIN 300 MG/1
CAPSULE ORAL
Qty: 360 CAPSULE | Refills: 0 | OUTPATIENT
Start: 2019-05-06

## 2019-05-06 ASSESSMENT — PATIENT HEALTH QUESTIONNAIRE - PHQ9
5. POOR APPETITE OR OVEREATING: MORE THAN HALF THE DAYS
SUM OF ALL RESPONSES TO PHQ QUESTIONS 1-9: 10

## 2019-05-06 ASSESSMENT — ANXIETY QUESTIONNAIRES
1. FEELING NERVOUS, ANXIOUS, OR ON EDGE: SEVERAL DAYS
5. BEING SO RESTLESS THAT IT IS HARD TO SIT STILL: NOT AT ALL
3. WORRYING TOO MUCH ABOUT DIFFERENT THINGS: SEVERAL DAYS
2. NOT BEING ABLE TO STOP OR CONTROL WORRYING: SEVERAL DAYS
GAD7 TOTAL SCORE: 6
IF YOU CHECKED OFF ANY PROBLEMS ON THIS QUESTIONNAIRE, HOW DIFFICULT HAVE THESE PROBLEMS MADE IT FOR YOU TO DO YOUR WORK, TAKE CARE OF THINGS AT HOME, OR GET ALONG WITH OTHER PEOPLE: SOMEWHAT DIFFICULT
6. BECOMING EASILY ANNOYED OR IRRITABLE: NOT AT ALL
7. FEELING AFRAID AS IF SOMETHING AWFUL MIGHT HAPPEN: SEVERAL DAYS

## 2019-05-06 ASSESSMENT — MIFFLIN-ST. JEOR: SCORE: 1336.43

## 2019-05-06 ASSESSMENT — PAIN SCALES - GENERAL: PAINLEVEL: NO PAIN (0)

## 2019-05-06 NOTE — PROGRESS NOTES
"Nursing Notes:   Ofe Zhao LPN  5/6/2019 11:23 AM  Signed  Chief Complaint   Patient presents with     Recheck Medication       Initial BP (!) 174/94   Pulse 72   Temp 97.1  F (36.2  C) (Temporal)   Resp 18   Ht 1.651 m (5' 5\")   Wt 82.6 kg (182 lb)   Breastfeeding? No   BMI 30.29 kg/m    Estimated body mass index is 30.29 kg/m  as calculated from the following:    Height as of this encounter: 1.651 m (5' 5\").    Weight as of this encounter: 82.6 kg (182 lb).  Medication Reconciliation: complete    Ofe Zhao LPN    SUBJECTIVE:  Annette Layton  is a 73 year old female who comes in today for follow-up and medication management.  I have not seen her for over a year.    She was following with endocrinology.  She had a suppressed TSH and empirically was treated for a while with methimazole.  She continues to sleep a lot.  She continues on gabapentin for polyneuropathy.  She continues on 225 mg of Effexor XR daily. She is interested in cutting down on that. She is convinced that her weight gain is due to medication rather than her lack of activity and dietary indiscretions.  She is convinced that there is something wrong with her thyroid and wants me to put her on Synthroid regardless of what her lab tests show.    Her last mammogram was in 2015.  She has not had colon cancer screening.  She had Zostavax but not Shingrix.  She is overdue for her Boostrix.  She has had Prevnar but is due for Pneumovax.    She has some hearing loss. She has a hearing aid but doesn't wear it.     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     Past Medical, Family, and Social History reviewed and updated as noted below.   ROS is negative except as noted above       Allergies   Allergen Reactions     Hydrocodone Nausea and Vomiting   , History reviewed. No pertinent family history.,   Current Outpatient Medications   Medication     acetaminophen-codeine (TYLENOL #3) 300-30 MG tablet     gabapentin (NEURONTIN) 300 MG capsule     " "ketoconazole (NIZORAL) 2 % cream     penicillin V (VEETID) 250 MG tablet     valACYclovir (VALTREX) 1000 mg tablet     venlafaxine (EFFEXOR-XR) 150 MG 24 hr capsule     No current facility-administered medications for this visit.    ,   Past Medical History:   Diagnosis Date     Hypothyroidism    ,   Patient Active Problem List    Diagnosis Date Noted     Tobacco abuse 05/06/2019     Priority: Medium     Primary osteoarthritis of both knees 02/28/2018     Priority: Medium     Chronic low back pain, unspecified back pain laterality, with sciatica presence unspecified 02/28/2018     Priority: Medium     Anxiety 02/28/2018     Priority: Medium     Polyneuropathy 02/28/2018     Priority: Medium     Urinary incontinence, unspecified type 02/28/2018     Priority: Medium     Other chronic pain 02/28/2018     Priority: Medium     Abnormal TSH 02/16/2018     Priority: Medium     Hypothyroidism 06/21/2017     Priority: Medium   ,   Past Surgical History:   Procedure Laterality Date     hemithyroidectomy  1979     HYSTERECTOMY  1989    and   Social History     Tobacco Use     Smoking status: Current Every Day Smoker     Packs/day: 1.00     Smokeless tobacco: Never Used   Substance Use Topics     Alcohol use: No     OBJECTIVE:  BP (!) 174/94   Pulse 72   Temp 97.1  F (36.2  C) (Temporal)   Resp 18   Ht 1.651 m (5' 5\")   Wt 82.6 kg (182 lb)   Breastfeeding? No   BMI 30.29 kg/m     EXAM:  Alert and cooperative, no distress.  Gets up on the exam table without difficulty.  Nose is clear.  Throat is clear.  Top and bottom dentures.  TMs appear clear.  Neck is supple without significant adenopathy.  Well-healed thyroidectomy scar is noted no palpable masses or adenopathy noted.  No bruits.  Lungs are clear, no rales rhonchi or wheezes are heard.  Somewhat diminished breath sounds are noted.  Cardiac RRR without murmur.  No pedal edema.  ASSESSMENT/Plan :    Annette was seen today for recheck medication.    Diagnoses and all " orders for this visit:    Hypothyroidism, unspecified type  -     CBC with platelets differential; Future  -     Comprehensive metabolic panel; Future  -     T4 free; Future  -     Thyrotropin GH; Future  -     T3, Free; Future    Polyneuropathy  -     gabapentin (NEURONTIN) 300 MG capsule; Take 2 capsules (600 mg) by mouth 2 times daily    Visit for screening mammogram  -     MA Screening Digital Bilateral; Future    Need for diphtheria-tetanus-pertussis (Tdap) vaccine  -     GH IMM-  TDAP VACCINE (BOOSTRIX )    Nonvenomous insect bite of multiple sites, initial encounter  -     GH IMM-  TDAP VACCINE (BOOSTRIX )    Need for vaccination against Streptococcus pneumoniae  -      IMM-  PNEUMOCOCCAL VACCINE,ADULT,SQ OR IM    Chronic fatigue    Tobacco abuse      Decrease gabapentin to 300 mg in a.m. 600 mg (2 tab) in pm for 2 weeks.  Then decrease to 300 mg twice daily thereafter.  Let us know after a month if the fatigue is improved and if the burning feet change or not.     Drop the Effexor from 225 mg daily down to 150 mg daily. (only take the larger tab and stop the smaller one).     Ask the pharmacist about the new shingles shot.     Cologuard is ordered.    TDAP and Pneumovax today.      A total of 25 minutes was spent with the patient, greater than 50% of the time was spent in counseling/discussion of the aforementioned concerns.     Harman Hogan MD

## 2019-05-06 NOTE — TELEPHONE ENCOUNTER
Redundant Refill Request for Gabapentin refused;    Outpatient Medication Detail      Disp Refills Start End [facility]   gabapentin (NEURONTIN) 300 MG capsule 360 capsule 11 5/6/2019  No   Sig - Route: Take 2 capsules (600 mg) by mouth 2 times daily - Oral   Sent to pharmacy as: gabapentin (NEURONTIN) 300 MG capsule   Class: E-Prescribe   Order: [id number]   E-Prescribing Status: Receipt confirmed by pharmacy (5/6/2019 11:38 AM CDT)   Printout Tracking     External Result Report   Pharmacy     [id number] - [place], [place] - [address]     Unable to complete prescription refill per RN Medication Refill Policy. Joon Erwin 5/6/2019 1:33 PM

## 2019-05-06 NOTE — NURSING NOTE
"Chief Complaint   Patient presents with     Recheck Medication       Initial BP (!) 174/94   Pulse 72   Temp 97.1  F (36.2  C) (Temporal)   Resp 18   Ht 1.651 m (5' 5\")   Wt 82.6 kg (182 lb)   Breastfeeding? No   BMI 30.29 kg/m   Estimated body mass index is 30.29 kg/m  as calculated from the following:    Height as of this encounter: 1.651 m (5' 5\").    Weight as of this encounter: 82.6 kg (182 lb).  Medication Reconciliation: complete    Ofe Zhao LPN  "

## 2019-05-06 NOTE — PATIENT INSTRUCTIONS
Decrease gabapentin to 300 mg in a.m. 600 mg (2 tab) in pm for 2 weeks.  Then decrease to 300 mg twice daily thereafter.  Let us know after a month if the fatigue is improved and if the burning feet change or not.     Drop the Effexor from 225 mg daily down to 150 mg daily. (only take the larger tab and stop the smaller one).     Ask the pharmacist about the new shingles shot.     Cologuard is ordered.

## 2019-05-07 ASSESSMENT — ANXIETY QUESTIONNAIRES: GAD7 TOTAL SCORE: 6

## 2019-05-17 ENCOUNTER — HOSPITAL ENCOUNTER (OUTPATIENT)
Dept: MAMMOGRAPHY | Facility: OTHER | Age: 74
Discharge: HOME | End: 2019-05-17
Attending: FAMILY MEDICINE | Admitting: FAMILY MEDICINE
Payer: MEDICARE

## 2019-05-17 DIAGNOSIS — Z12.31 VISIT FOR SCREENING MAMMOGRAM: ICD-10-CM

## 2019-05-17 PROCEDURE — 77063 BREAST TOMOSYNTHESIS BI: CPT

## 2019-06-29 DIAGNOSIS — G62.9 POLYNEUROPATHY: ICD-10-CM

## 2019-06-29 DIAGNOSIS — F41.9 ANXIETY: ICD-10-CM

## 2019-07-03 RX ORDER — VENLAFAXINE HYDROCHLORIDE 75 MG/1
CAPSULE, EXTENDED RELEASE ORAL
Qty: 90 CAPSULE | Refills: 0 | Status: SHIPPED | OUTPATIENT
Start: 2019-07-03 | End: 2019-10-30

## 2019-09-23 DIAGNOSIS — G89.29 OTHER CHRONIC PAIN: ICD-10-CM

## 2019-09-26 NOTE — TELEPHONE ENCOUNTER
acetaminophen-codeine (TYLENOL #3) 300-30 MG tablet  Sig - Route: TAKE 1 TABLET BY MOUTH EVERY 6 HOURS AS NEEDED FOR MODERATE PAIN - Oral          Last Written Prescription Date:  2/27/2019    Last Fill Quantity: 120,   # refills: 5    Last Office Visit: 5/6/2019    Future Office visit:  No future appointment scheduled at this time.     Routing refill request to provider for review/approval because:  [place] [facility] [place] [facility] refill protocol or controlled substance    Unable to complete prescription refill per RN Medication Refill Policy.................... Madison Shepherd RN ....................  9/26/2019   12:31 PM      Requested Prescriptions   Pending Prescriptions Disp Refills     acetaminophen-codeine (TYLENOL #3) 300-30 MG tablet [Pharmacy Med Name: ACETAMINOPHEN/COD #3 (300/30MG) TAB] 120 tablet 0     Sig: TAKE 1 TABLET EVERY 6 HOURS AS NEEDED FOR MODERATE PAIN       There is no refill protocol information for this order

## 2019-10-01 PROBLEM — M54.50 CHRONIC LOW BACK PAIN: Status: ACTIVE | Noted: 2018-02-28

## 2019-10-30 ENCOUNTER — OFFICE VISIT (OUTPATIENT)
Dept: FAMILY MEDICINE | Facility: OTHER | Age: 74
End: 2019-10-30
Attending: FAMILY MEDICINE
Payer: MEDICARE

## 2019-10-30 VITALS
DIASTOLIC BLOOD PRESSURE: 86 MMHG | TEMPERATURE: 97.5 F | HEART RATE: 88 BPM | OXYGEN SATURATION: 96 % | RESPIRATION RATE: 20 BRPM | SYSTOLIC BLOOD PRESSURE: 148 MMHG

## 2019-10-30 DIAGNOSIS — B00.9 HSV (HERPES SIMPLEX VIRUS) INFECTION: ICD-10-CM

## 2019-10-30 DIAGNOSIS — Z72.0 TOBACCO ABUSE: ICD-10-CM
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
## 2019-10-30 DIAGNOSIS — R40.0 DAYTIME SOMNOLENCE: Primary | ICD-10-CM

## 2019-10-30 DIAGNOSIS — R53.83 OTHER FATIGUE: ICD-10-CM

## 2019-10-30 DIAGNOSIS — Z87.891 PERSONAL HISTORY OF TOBACCO USE: ICD-10-CM

## 2019-10-30 DIAGNOSIS — E53.8 VITAMIN B12 DEFICIENCY (NON ANEMIC): ICD-10-CM

## 2019-10-30 DIAGNOSIS — E55.9 VITAMIN D DEFICIENCY: ICD-10-CM

## 2019-10-30 DIAGNOSIS — E03.4 ATROPHY OF THYROID (ACQUIRED): ICD-10-CM

## 2019-10-30 DIAGNOSIS — R79.89 ABNORMAL TSH: ICD-10-CM

## 2019-10-30 LAB
ALBUMIN SERPL-MCNC: 4.3 G/DL (ref 3.5–5.7)
ALP SERPL-CCNC: 65 U/L (ref 34–104)
ALT SERPL W P-5'-P-CCNC: 9 U/L (ref 7–52)
ANION GAP SERPL CALCULATED.3IONS-SCNC: 10 MMOL/L (ref 3–14)
AST SERPL W P-5'-P-CCNC: 15 U/L (ref 13–39)
BASOPHILS # BLD AUTO: 0 10E9/L (ref 0–0.2)
BASOPHILS NFR BLD AUTO: 0.4 %
BILIRUB SERPL-MCNC: 0.3 MG/DL (ref 0.3–1)
BUN SERPL-MCNC: 18 MG/DL (ref 7–25)
CALCIUM SERPL-MCNC: 10 MG/DL (ref 8.6–10.3)
CHLORIDE SERPL-SCNC: 103 MMOL/L (ref 98–107)
CO2 SERPL-SCNC: 24 MMOL/L (ref 21–31)
CREAT SERPL-MCNC: 1.01 MG/DL (ref 0.6–1.2)
DEPRECATED CALCIDIOL+CALCIFEROL SERPL-MC: 66.4 NG/ML
DIFFERENTIAL METHOD BLD: NORMAL
EOSINOPHIL # BLD AUTO: 0.3 10E9/L (ref 0–0.7)
EOSINOPHIL NFR BLD AUTO: 3 %
ERYTHROCYTE [DISTWIDTH] IN BLOOD BY AUTOMATED COUNT: 14.2 % (ref 10–15)
GFR SERPL CREATININE-BSD FRML MDRD: 54 ML/MIN/{1.73_M2}
GLUCOSE SERPL-MCNC: 123 MG/DL (ref 70–105)
HCT VFR BLD AUTO: 39.8 % (ref 35–47)
HGB BLD-MCNC: 12.7 G/DL (ref 11.7–15.7)
IMM GRANULOCYTES # BLD: 0 10E9/L (ref 0–0.4)
IMM GRANULOCYTES NFR BLD: 0.3 %
LYMPHOCYTES # BLD AUTO: 2.2 10E9/L (ref 0.8–5.3)
LYMPHOCYTES NFR BLD AUTO: 22.7 %
MCH RBC QN AUTO: 29.6 PG (ref 26.5–33)
MCHC RBC AUTO-ENTMCNC: 31.9 G/DL (ref 31.5–36.5)
MCV RBC AUTO: 93 FL (ref 78–100)
MONOCYTES # BLD AUTO: 0.9 10E9/L (ref 0–1.3)
MONOCYTES NFR BLD AUTO: 9.6 %
NEUTROPHILS # BLD AUTO: 6.2 10E9/L (ref 1.6–8.3)
NEUTROPHILS NFR BLD AUTO: 64 %
PLATELET # BLD AUTO: 294 10E9/L (ref 150–450)
POTASSIUM SERPL-SCNC: 3.9 MMOL/L (ref 3.5–5.1)
PROT SERPL-MCNC: 8.2 G/DL (ref 6.4–8.9)
RBC # BLD AUTO: 4.29 10E12/L (ref 3.8–5.2)
SODIUM SERPL-SCNC: 137 MMOL/L (ref 134–144)
T3FREE SERPL-MCNC: 3.6 PG/ML (ref 2.5–3.9)
T4 FREE SERPL-MCNC: 0.68 NG/DL (ref 0.6–1.6)
TSH SERPL DL<=0.05 MIU/L-ACNC: <0.2 IU/ML (ref 0.34–5.6)
VIT B12 SERPL-MCNC: 1415 PG/ML (ref 180–914)
WBC # BLD AUTO: 9.8 10E9/L (ref 4–11)

## 2019-10-30 PROCEDURE — 99215 OFFICE O/P EST HI 40 MIN: CPT | Mod: 25 | Performed by: FAMILY MEDICINE

## 2019-10-30 PROCEDURE — 82306 VITAMIN D 25 HYDROXY: CPT | Mod: ZL | Performed by: FAMILY MEDICINE

## 2019-10-30 PROCEDURE — 82607 VITAMIN B-12: CPT | Mod: ZL | Performed by: FAMILY MEDICINE

## 2019-10-30 PROCEDURE — 84481 FREE ASSAY (FT-3): CPT | Mod: ZL | Performed by: FAMILY MEDICINE

## 2019-10-30 PROCEDURE — 84439 ASSAY OF FREE THYROXINE: CPT | Mod: ZL | Performed by: FAMILY MEDICINE

## 2019-10-30 PROCEDURE — G0463 HOSPITAL OUTPT CLINIC VISIT: HCPCS

## 2019-10-30 PROCEDURE — G0463 HOSPITAL OUTPT CLINIC VISIT: HCPCS | Mod: 25

## 2019-10-30 PROCEDURE — 86618 LYME DISEASE ANTIBODY: CPT | Mod: ZL | Performed by: FAMILY MEDICINE

## 2019-10-30 PROCEDURE — 80053 COMPREHEN METABOLIC PANEL: CPT | Mod: ZL | Performed by: FAMILY MEDICINE

## 2019-10-30 PROCEDURE — 84443 ASSAY THYROID STIM HORMONE: CPT | Mod: ZL | Performed by: FAMILY MEDICINE

## 2019-10-30 PROCEDURE — G0296 VISIT TO DETERM LDCT ELIG: HCPCS | Performed by: FAMILY MEDICINE

## 2019-10-30 PROCEDURE — 85025 COMPLETE CBC W/AUTO DIFF WBC: CPT | Mod: ZL | Performed by: FAMILY MEDICINE

## 2019-10-30 PROCEDURE — 36415 COLL VENOUS BLD VENIPUNCTURE: CPT | Mod: ZL | Performed by: FAMILY MEDICINE

## 2019-10-30 RX ORDER — VALACYCLOVIR HYDROCHLORIDE 1 G/1
TABLET, FILM COATED ORAL
Qty: 8 TABLET | Refills: 3 | Status: SHIPPED | OUTPATIENT
Start: 2019-10-30 | End: 2020-01-01

## 2019-10-30 ASSESSMENT — ANXIETY QUESTIONNAIRES
2. NOT BEING ABLE TO STOP OR CONTROL WORRYING: NEARLY EVERY DAY
6. BECOMING EASILY ANNOYED OR IRRITABLE: NEARLY EVERY DAY
1. FEELING NERVOUS, ANXIOUS, OR ON EDGE: NOT AT ALL
5. BEING SO RESTLESS THAT IT IS HARD TO SIT STILL: NOT AT ALL
IF YOU CHECKED OFF ANY PROBLEMS ON THIS QUESTIONNAIRE, HOW DIFFICULT HAVE THESE PROBLEMS MADE IT FOR YOU TO DO YOUR WORK, TAKE CARE OF THINGS AT HOME, OR GET ALONG WITH OTHER PEOPLE: VERY DIFFICULT
GAD7 TOTAL SCORE: 11
3. WORRYING TOO MUCH ABOUT DIFFERENT THINGS: NEARLY EVERY DAY
7. FEELING AFRAID AS IF SOMETHING AWFUL MIGHT HAPPEN: MORE THAN HALF THE DAYS

## 2019-10-30 ASSESSMENT — PAIN SCALES - GENERAL: PAINLEVEL: MODERATE PAIN (5)

## 2019-10-30 ASSESSMENT — PATIENT HEALTH QUESTIONNAIRE - PHQ9
5. POOR APPETITE OR OVEREATING: NOT AT ALL
SUM OF ALL RESPONSES TO PHQ QUESTIONS 1-9: 21

## 2019-10-30 NOTE — PROGRESS NOTES
"Nursing Notes:   Ofe Zhao LPN  10/30/2019  8:08 AM  Signed  Chief Complaint   Patient presents with     Recheck Medication       Initial BP (!) 150/94   Pulse 88   Temp 97.5  F (36.4  C) (Temporal)   Resp 20   SpO2 96%   Breastfeeding? No  Estimated body mass index is 30.29 kg/m  as calculated from the following:    Height as of 5/6/19: 1.651 m (5' 5\").    Weight as of 5/6/19: 82.6 kg (182 lb).  Medication Reconciliation: complete    Ofe Zhao LPN    SUBJECTIVE:  Annette Layton  is a 73 year old female who comes in today for follow-up and medication management.  I last saw her in May.  She continues on gabapentin for polyneuropathy.  This was decreased down to 300 mg twice daily over time.  She continued on Effexor XR and we reduced her down to 150 mg daily.  In mid summer, she dropped down to 75 mg daily.  Now she has been off.  She wanted to go off of it because she thought it was making her fat.  She did not let us weigh her.      We rechecked her thyroid and she had a normal free T3 and free T4 and a slightly suppressed TSH so we did not do anything different with her thyroid.  She had seen endocrinology in the past and was convinced that she needed to be put on thyroid hormone.  She continues to be obsessed about thyroid hormone and wants us to put her on it despite with blood test say.  She seems to somehow think that Lyme disease in her past has an effect on thyroid hormone.  She wants to be checked again for Lyme and asked about having blood sent to [place] because the local ones are not accurate.  We told her that this is actually the biggest hot bed area for Lyme disease in the [place] and that our tests are probably more accurate than anyone else's.    She had mammogram in the spring.  We ordered Cologuard but she did not do it.  She is willing to do it again.    She needs a refill of Valtrex which she takes for cold sores.  She continues on Tylenol 3 for arthritis pain.    She " sleeps a lot.  She doesn't know if she snores. Her nose is chronically plugged.  She has very low energy.  We discussed sending her for sleep evaluation but she is not can consented to that but again we convinced her today to send a consultation to Dr. Worrell.  She may actually have some sleep disorder whether JARRED or narcolepsy.    She can't hear. She has hearing aides but doesn't think they help. She can use the phone in the car.  Its hard for her to receive phone calls but she can make outgoing calls.    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    PHQ-9 SCORE 2/28/2018 5/6/2019 10/30/2019   PHQ-9 Total Score 14 10 21     MARY-7 SCORE 4/19/2017 5/6/2019 10/30/2019   Total Score 15 6 11           Past Medical, Family, and Social History reviewed and updated as noted below.   ROS is negative except as noted above       Allergies   Allergen Reactions     Hydrocodone Nausea and Vomiting   , History reviewed. No pertinent family history.,   Current Outpatient Medications   Medication     acetaminophen-codeine (TYLENOL #3) 300-30 MG tablet     gabapentin (NEURONTIN) 300 MG capsule     ketoconazole (NIZORAL) 2 % cream     penicillin V (VEETID) 250 MG tablet     valACYclovir (VALTREX) 1000 mg tablet     No current facility-administered medications for this visit.    ,   Past Medical History:   Diagnosis Date     Hypothyroidism    ,   Patient Active Problem List    Diagnosis Date Noted     Tobacco abuse 05/06/2019     Priority: Medium     Primary osteoarthritis of both knees 02/28/2018     Priority: Medium     Chronic low back pain, unspecified back pain laterality, with sciatica presence unspecified 02/28/2018     Priority: Medium     Anxiety 02/28/2018     Priority: Medium     Polyneuropathy 02/28/2018     Priority: Medium     Urinary incontinence, unspecified type 02/28/2018     Priority: Medium     Other chronic pain  02/28/2018     Priority: Medium     Abnormal TSH 02/16/2018     Priority: Medium     Hypothyroidism 06/21/2017     Priority: Medium   ,   Past Surgical History:   Procedure Laterality Date     hemithyroidectomy  1979     HYSTERECTOMY  1989    and   Social History     Tobacco Use     Smoking status: Current Every Day Smoker     Packs/day: 1.00     Smokeless tobacco: Never Used   Substance Use Topics     Alcohol use: No     OBJECTIVE:  BP (!) 148/86   Pulse 88   Temp 97.5  F (36.4  C) (Temporal)   Resp 20   SpO2 96%   Breastfeeding? No    EXAM:  Alert and cooperative, no distress.  Nose is clear.  She can breathe through each nostril.  She does have a deviated septum.  She was convinced that she was a mouth breather but we discussed the fact that she is actually pursed lip breathing because of her lung disease.  Throat is clear.  Neck is supple without thyromegaly.  No palpable thyroid nodules.  No audible bruits.  TMs appear clear.  No adenopathy in her neck.  Lungs are clear with prolonged expiratory phase but no rales or wheezes heard.  Cardiac RRR without murmur.  Abdomen is soft and nontender.    Results for orders placed or performed in visit on 10/30/19   T3, Free   Result Value Ref Range    Free T3 3.6 2.5 - 3.9 pg/mL   Thyrotropin GH   Result Value Ref Range    Thyrotropin <0.20 (L) 0.34 - 5.60 IU/mL   T4 free   Result Value Ref Range    T4 Free 0.68 0.60 - 1.60 ng/dL   Comprehensive metabolic panel   Result Value Ref Range    Sodium 137 134 - 144 mmol/L    Potassium 3.9 3.5 - 5.1 mmol/L    Chloride 103 98 - 107 mmol/L    Carbon Dioxide 24 21 - 31 mmol/L    Anion Gap 10 3 - 14 mmol/L    Glucose 123 (H) 70 - 105 mg/dL    Urea Nitrogen 18 7 - 25 mg/dL    Creatinine 1.01 0.60 - 1.20 mg/dL    GFR Estimate 54 (L) >60 mL/min/[1.73_m2]    GFR Estimate If Black 65 >60 mL/min/[1.73_m2]    Calcium 10.0 8.6 - 10.3 mg/dL    Bilirubin Total 0.3 0.3 - 1.0 mg/dL    Albumin 4.3 3.5 - 5.7 g/dL    Protein Total 8.2 6.4 -  8.9 g/dL    Alkaline Phosphatase 65 34 - 104 U/L    ALT 9 7 - 52 U/L    AST 15 13 - 39 U/L   CBC with platelets differential   Result Value Ref Range    WBC 9.8 4.0 - 11.0 10e9/L    RBC Count 4.29 3.8 - 5.2 10e12/L    Hemoglobin 12.7 11.7 - 15.7 g/dL    Hematocrit 39.8 35.0 - 47.0 %    MCV 93 78 - 100 fl    MCH 29.6 26.5 - 33.0 pg    MCHC 31.9 31.5 - 36.5 g/dL    RDW 14.2 10.0 - 15.0 %    Platelet Count 294 150 - 450 10e9/L    Diff Method Automated Method     % Neutrophils 64.0 %    % Lymphocytes 22.7 %    % Monocytes 9.6 %    % Eosinophils 3.0 %    % Basophils 0.4 %    % Immature Granulocytes 0.3 %    Absolute Neutrophil 6.2 1.6 - 8.3 10e9/L    Absolute Lymphocytes 2.2 0.8 - 5.3 10e9/L    Absolute Monocytes 0.9 0.0 - 1.3 10e9/L    Absolute Eosinophils 0.3 0.0 - 0.7 10e9/L    Absolute Basophils 0.0 0.0 - 0.2 10e9/L    Abs Immature Granulocytes 0.0 0 - 0.4 10e9/L   Vitamin B12   Result Value Ref Range    Vitamin B12 1,415 (H) 180 - 914 pg/mL   Vitamin D Total GH   Result Value Ref Range    Vitamin D Total 66.4 ng/mL      ASSESSMENT/Plan :    Annette was seen today for recheck medication.    Diagnoses and all orders for this visit:    Daytime somnolence  -     SLEEP EVALUATION & MANAGEMENT REFERRAL - ADULT -[facility] [phone] (Age 13 and up); Future  -     CBC with platelets differential; Future  -     Comprehensive metabolic panel; Future  -     Comprehensive metabolic panel  -     CBC with platelets differential    HSV (herpes simplex virus) infection  -     valACYclovir (VALTREX) 1000 mg tablet; TAKE 2 TABLETS BY MOUTH EVERY 12 HOURS FOR 2 DOSES AT ONSET OF COLD SORES    Personal history of tobacco use  -     Prof fee: Shared Decisionmaking for Lung Cancer Screening  -     CT Chest Lung Cancer Scrn Low Dose wo; Future  -     Okay for Smoking Cessation Study (PLUTO) to Contact Patient    Tick bite, initial encounter  -     Lyme Disease Yanna with reflex to WB Serum; Future  -      Lyme Disease Yanna with reflex to WB Serum    Abnormal TSH  -     T4 free; Future  -     Thyrotropin GH; Future  -     T3, Free; Future  -     T3, Free  -     Thyrotropin GH  -     T4 free    Vitamin D deficiency  -     Vitamin D Total GH; Future  -     Vitamin D Total GH    Vitamin B12 deficiency (non anemic)  -     Vitamin B12; Future  -     Vitamin B12    Other fatigue  -     CBC with platelets differential; Future  -     Comprehensive metabolic panel; Future  -     Comprehensive metabolic panel  -     CBC with platelets differential    Atrophy of thyroid (acquired)   -     T4 free; Future  -     Thyrotropin GH; Future  -     Thyrotropin GH  -     T4 free    Tobacco abuse      Will notify of lab results when available.  She continues to have a suppressed TSH but a normal free T3 and free T4.  Pituitary testing done by endocrinology was all normal so that was not repeated.  Lyme titer is pending.  Certainly no indication at this time for thyroid supplementation.  B12 and vitamin D are appropriate for her supplementation.    Discussed pursed lip breathing and likely emphysema/COPD from smoking.  She has no desire to quit smoking.  She is starting to think about it.  Discussed low-dose CT scan for lung cancer screening and she wishes to proceed with that.    I think consultation with the sleep specialist is imperative.  She needs to be evaluated for sleep apnea or another sleep disorder such as narcolepsy if indeed she is sleeping as much as she says she has.    A total of 40 minutes was spent with the patient, greater than 50% of the time was spent in counseling/discussion of the aforementioned concerns.     Harman Hogan MD            Lung Cancer Screening Shared Decision Making Visit     Annette Layton is eligible for lung cancer screening on the basis of the information provided in my signed lung cancer screening order.     I have discussed with patient the risks and benefits of screening for lung cancer  with low-dose CT.     The risks include:  radiation exposure: one low dose chest CT has as much ionizing radiation as about 15 chest x-rays or 6 months of background radiation living in [place]    false positives: 96% of positive findings/nodules are NOT cancer, but some might still require additional diagnostic evaluation, including biopsy  over-diagnosis: some slow growing cancers that might never have been clinically significant will be detected and treated unnecessarily     The benefit of early detection of lung cancer is contingent upon adherence to annual screening or more frequent follow up if indicated.     Furthermore, reaping the benefits of screening requires Annette BRIDGER Layton to be willing and physically able to undergo diagnostic procedures, if indicated. Although no specific guide is available for determining severity of comorbidities, it is reasonable to withhold screening in patients who have greater mortality risk from other diseases.     We did discuss that the only way to prevent lung cancer is to not smoke. Smoking cessation assistance was offered.    I did not offer risk estimation using a calculator such as this one:    ShouldIScreen

## 2019-10-30 NOTE — PATIENT INSTRUCTIONS
Decrease gabapentin to 300 mg in a.m. 600 mg (2 tab) in pm for 2 weeks.  Then decrease to 300 mg twice daily thereafter.  Let us know after a month if the fatigue is improved and if the burning feet change or not.      Lung Cancer Screening   Frequently Asked Questions  If you are at high-risk for lung cancer, getting screened with low-dose computed tomography (LDCT) every year can help save your life. This handout offers answers to some of the most common questions about lung cancer screening. If you have other questions, please call [phone] ([phone]).     What is it?  Lung cancer screening uses special X-ray technology to create an image of your lung tissue. The exam is quick and easy and takes less than 10 seconds. We don t give you any medicine or use any needles. You can eat before and after the exam. You don t need to change your clothes as long as the clothing on your chest doesn t contain metal. But, you do need to be able to hold your breath for at least 6 seconds during the exam.    What is the goal of lung cancer screening?  The goal of lung cancer screening is to save lives. Many times, lung cancer is not found until a person starts having physical symptoms. Lung cancer screening can help detect lung cancer in the earliest stages when it may be easier to treat.    Who should be screened for lung cancer?  We suggest lung cancer screening for anyone who is at high-risk for lung cancer. You are in the high-risk group if you:      are between the ages of 55 and 79, and    have smoked at least 1 pack of cigarettes a day for 30 or more years, and    still smoke or have quit within the past 15 years.    However, if you have a new cough or shortness of breath, you should talk to your doctor before being screened.    Some national lung health advocacy groups also recommend screening for people ages 50 to 79 who have smoked an average of 1 pack of cigarettes a day for 20 years. They must also  have at least 1 other risk factor for lung cancer, not including exposure to secondhand smoke. Other risk factors are having had cancer in the past, emphysema, pulmonary fibrosis, COPD, a family history of lung cancer, or exposure to certain materials such as arsenic, asbestos, beryllium, cadmium, chromium, diesel fumes, nickel, radon or silica. Your care team can help you know if you have one of these risk factors.     Why does it matter if I have symptoms?  Certain symptoms can be a sign that you have a condition in your lungs that should be checked and treated by your doctor. These symptoms include fever, chest pain, a new or changing cough, shortness of breath that you have never felt before, coughing up blood or unexplained weight loss. Having any of these symptoms can greatly affect the results of lung cancer screening.       Should all smokers get an LDCT lung cancer screening exam?  It depends. Lung cancer screening is for a very specific group of men and women who have a history of heavy smoking over a long period of time (see  Who should be screened for lung cancer  above).  I am in the high-risk group, but have been diagnosed with cancer in the past. Is LDCT lung cancer screening right for me?  In some cases, you should not have LDCT lung screening, such as when your doctor is already following your cancer with CT scan studies. Your doctor will help you decide if LDCT lung screening is right for you.  Do I need to have a screening exam every year?  Yes. If you are in the high-risk group described earlier, you should get an LDCT lung cancer screening exam every year until you are 79, or are no longer willing or able to undergo screening and possible procedures to diagnose and treat lung cancer.  How effective is LDCT at preventing death from lung cancer?  Studies have shown that LDCT lung cancer screening can lower the risk of death from lung cancer by 20 percent in people who are at high-risk.  What are  the risks?  There are some risks and limitations of LDCT lung cancer screening. We want to make sure you understand the risks and benefits, so please let us know if you have any questions. Your doctor may want to talk with you more about these risks.    Radiation exposure: As with any exam that uses radiation, there is a very small increased risk of cancer. The amount of radiation in LDCT is small--about the same amount a person would get from a mammogram. Your doctor orders the exam when he or she feels the potential benefits outweigh the risks.    False negatives: No test is perfect, including LDCT. It is possible that you may have a medical condition, including lung cancer, that is not found during your exam. This is called a false negative result.    False positives and more testing: LDCT very often finds something in the lung that could be cancer, but in fact is not. This is called a false positive result. False positive tests often cause anxiety. To make sure these findings are not cancer, you may need to have more tests. These tests will be done only if you give us permission. Sometimes patients need a treatment that can have side effects, such as a biopsy. For more information on false positives, see  What can I expect from the results?     Findings not related to lung cancer: Your LDCT exam also takes pictures of areas of your body next to your lungs. In a very small number of cases, the CT scan will show an abnormal finding in one of these areas, such as your kidneys, adrenal glands, liver or thyroid. This finding may not be serious, but you may need more tests. Your doctor can help you decide what other tests you may need, if any.  What can I expect from the results?  About 1 out of 4 LDCT exams will find something that may need more tests. Most of the time, these findings are lung nodules. Lung nodules are very small collections of tissue in the lung. These nodules are very common, and the vast  majority--more than 97 percent--are not cancer (benign). Most are normal lymph nodes or small areas of scarring from past infections.  But, if a small lung nodule is found to be cancer, the cancer can be cured more than 90 percent of the time. To know if the nodule is cancer, we may need to get more images before your next yearly screening exam. If the nodule has suspicious features (for example, it is large, has an odd shape or grows over time), we will refer you to a specialist for further testing.  Will my doctor also get the results?  Yes. Your doctor will get a copy of your results.  Is it okay to keep smoking now that there s a cancer screening exam?  No. Tobacco is one of the strongest cancer-causing agents. It causes not only lung cancer, but other cancers and cardiovascular (heart) diseases as well. The damage caused by smoking builds over time. This means that the longer you smoke, the higher your risk of disease. While it is never too late to quit, the sooner you quit, the better.  Where can I find help to quit smoking?  The best way to prevent lung cancer is to stop smoking. If you have already quit smoking, congratulations and keep it up! For help on quitting smoking, please call [employer] at 8-819-021-NUIY (6134) or the American Cancer Society at 1-173.750.5645 to find local resources near you.  One-on-one health coaching:  If you d prefer to work individually with a health care provider on tobacco cessation, we offer:      Medication Therapy Management:  Our specially trained pharmacists work closely with you and your doctor to help you quit smoking.  Call [phone] or [phone] (toll free).     Can Do: Health coaching offered by [facility].  [email or web address]

## 2019-10-30 NOTE — LETTER
My Depression Action Plan  Name: Annette Layton   Date of Birth 1945  Date: 10/30/2019    My doctor: Harman Hogan   My clinic: [facility]  [address]  [place] [id number] [phone]          GREEN    ZONE   Good Control    What it looks like:     Things are going generally well. You have normal up s and down s. You may even feel depressed from time to time, but bad moods usually last less than a day.   What you need to do:  1. Continue to care for yourself (see self care plan)  2. Check your depression survival kit and update it as needed  3. Follow your physician s recommendations including any medication.  4. Do not stop taking medication unless you consult with your physician first.           YELLOW         ZONE Getting Worse    What it looks like:     Depression is starting to interfere with your life.     It may be hard to get out of bed; you may be starting to isolate yourself from others.    Symptoms of depression are starting to last most all day and this has happened for several days.     You may have suicidal thoughts but they are not constant.   What you need to do:     1. Call your care team, your response to treatment will improve if you keep your care team informed of your progress. Yellow periods are signs an adjustment may need to be made.     2. Continue your self-care, even if you have to fake it!    3. Talk to someone in your support network    4. Open up your depression survival kit           RED    ZONE Medical Alert - Get Help    What it looks like:     Depression is seriously interfering with your life.     You may experience these or other symptoms: You can t get out of bed most days, can t work or engage in other necessary activities, you have trouble taking care of basic hygiene, or basic responsibilities, thoughts of suicide or death that will not go away, self-injurious behavior.     What you need to do:  1. Call your care  team and request a same-day appointment. If they are not available (weekends or after hours) call your local crisis line, emergency room or 911.            Depression Self Care Plan / Survival Kit    Self-Care for Depression  Here s the deal. Your body and mind are really not as separate as most people think.  What you do and think affects how you feel and how you feel influences what you do and think. This means if you do things that people who feel good do, it will help you feel better.  Sometimes this is all it takes.  There is also a place for medication and therapy depending on how severe your depression is, so be sure to consult with your medical provider and/ or Behavioral Health Consultant if your symptoms are worsening or not improving.     In order to better manage my stress, I will:    Exercise  Get some form of exercise, every day. This will help reduce pain and release endorphins, the  feel good  chemicals in your brain. This is almost as good as taking antidepressants!  This is not the same as joining a gym and then never going! (they count on that by the way ) It can be as simple as just going for a walk or doing some gardening, anything that will get you moving.      Hygiene   Maintain good hygiene (Get out of bed in the morning, Make your bed, Brush your teeth, Take a shower, and Get dressed like you were going to work, even if you are unemployed).  If your clothes don't fit try to get ones that do.    Diet  I will strive to eat foods that are good for me, drink plenty of water, and avoid excessive sugar, caffeine, alcohol, and other mood-altering substances.  Some foods that are helpful in depression are: complex carbohydrates, B vitamins, flaxseed, fish or fish oil, fresh fruits and vegetables.    Psychotherapy  I agree to participate in Individual Therapy (if recommended).    Medication  If prescribed medications, I agree to take them.  Missing doses can result in serious side effects.  I  understand that drinking alcohol, or other illicit drug use, may cause potential side effects.  I will not stop my medication abruptly without first discussing it with my provider.    Staying Connected With Others  I will stay in touch with my friends, family members, and my primary care provider/team.    Use your imagination  Be creative.  We all have a creative side; it doesn t matter if it s oil painting, sand castles, or mud pies! This will also kick up the endorphins.    Witness Beauty  (AKA stop and smell the roses) Take a look outside, even in mid-winter. Notice colors, textures. Watch the squirrels and birds.     Service to others  Be of service to others.  There is always someone else in need.  By helping others we can  get out of ourselves  and remember the really important things.  This also provides opportunities for practicing all the other parts of the program.    Humor  Laugh and be silly!  Adjust your TV habits for less news and crime-drama and more comedy.    Control your stress  Try breathing deep, massage therapy, biofeedback, and meditation. Find time to relax each day.     My support system    Clinic Contact:  Phone number:    Contact 1:  Phone number:    Contact 2:  Phone number:    Yaz[employer]/:  Phone number:    Therapist:  Phone number:    Local crisis center:    Phone number:    Other community support:  Phone number:

## 2019-10-30 NOTE — NURSING NOTE
"Chief Complaint   Patient presents with     Recheck Medication       Initial BP (!) 150/94   Pulse 88   Temp 97.5  F (36.4  C) (Temporal)   Resp 20   SpO2 96%   Breastfeeding? No  Estimated body mass index is 30.29 kg/m  as calculated from the following:    Height as of 5/6/19: 1.651 m (5' 5\").    Weight as of 5/6/19: 82.6 kg (182 lb).  Medication Reconciliation: complete    Ofe Zhao LPN  "

## 2019-10-31 LAB — B BURGDOR IGG+IGM SER QL: 0.07 (ref 0–0.89)

## 2019-10-31 ASSESSMENT — ANXIETY QUESTIONNAIRES: GAD7 TOTAL SCORE: 11

## 2019-11-01 ENCOUNTER — HOSPITAL ENCOUNTER (OUTPATIENT)
Dept: CT IMAGING | Facility: OTHER | Age: 74
Discharge: HOME | End: 2019-11-01
Attending: FAMILY MEDICINE | Admitting: FAMILY MEDICINE
Payer: MEDICARE

## 2019-11-01 DIAGNOSIS — Z87.891 PERSONAL HISTORY OF TOBACCO USE: ICD-10-CM

## 2019-11-01 PROCEDURE — G0297 LDCT FOR LUNG CA SCREEN: HCPCS

## 2019-11-04 ENCOUNTER — MYC MEDICAL ADVICE (OUTPATIENT)
Dept: FAMILY MEDICINE | Facility: OTHER | Age: 74
End: 2019-11-04

## 2019-11-04 ENCOUNTER — TELEPHONE (OUTPATIENT)
Dept: FAMILY MEDICINE | Facility: OTHER | Age: 74
End: 2019-11-04

## 2019-11-04 DIAGNOSIS — Z01.812 PRE-PROCEDURE LAB EXAM: ICD-10-CM

## 2019-11-04 DIAGNOSIS — I71.20 THORACIC AORTIC ANEURYSM WITHOUT RUPTURE (H): ICD-10-CM

## 2019-11-04 DIAGNOSIS — I71.40 ABDOMINAL AORTIC ANEURYSM (AAA) WITHOUT RUPTURE (H): Primary | ICD-10-CM

## 2019-11-04 NOTE — TELEPHONE ENCOUNTER
Rachna from [facility] called In regard to CT on Nov 1st.  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  Mabel Whittington LPN on 11/4/2019 at 2:22 PM

## 2019-11-04 NOTE — TELEPHONE ENCOUNTER
I'll order the test. My Chart Message sent with results of scan.  Harman Hogan MD on 11/4/2019 at 2:29 PM

## 2019-11-05 ENCOUNTER — MYC MEDICAL ADVICE (OUTPATIENT)
Dept: SCHEDULING | Facility: OTHER | Age: 74
End: 2019-11-05

## 2020-01-01 ENCOUNTER — NURSE TRIAGE (OUTPATIENT)
Dept: NURSING | Facility: CLINIC | Age: 75
End: 2020-01-01

## 2020-01-01 ENCOUNTER — TELEPHONE (OUTPATIENT)
Dept: FAMILY MEDICINE | Facility: OTHER | Age: 75
End: 2020-01-01

## 2020-01-01 DIAGNOSIS — G62.9 POLYNEUROPATHY: ICD-10-CM

## 2020-01-01 DIAGNOSIS — G89.29 OTHER CHRONIC PAIN: ICD-10-CM

## 2020-01-01 DIAGNOSIS — B00.9 HSV (HERPES SIMPLEX VIRUS) INFECTION: ICD-10-CM

## 2020-01-01 DIAGNOSIS — I06.1 RHEUMATIC AORTIC INSUFFICIENCY: ICD-10-CM

## 2020-01-01 RX ORDER — GABAPENTIN 300 MG/1
CAPSULE ORAL
Qty: 360 CAPSULE | Refills: 11 | Status: SHIPPED | OUTPATIENT
Start: 2020-01-01 | End: 2020-01-01

## 2020-01-01 RX ORDER — GABAPENTIN 300 MG/1
CAPSULE ORAL
Qty: 360 CAPSULE | Refills: 11 | Status: SHIPPED | OUTPATIENT
Start: 2020-01-01

## 2020-01-01 RX ORDER — PENICILLIN V POTASSIUM 250 MG/1
TABLET, FILM COATED ORAL
Qty: 180 TABLET | Refills: 3 | Status: SHIPPED | OUTPATIENT
Start: 2020-01-01

## 2020-01-01 RX ORDER — VALACYCLOVIR HYDROCHLORIDE 1 G/1
TABLET, FILM COATED ORAL
Qty: 8 TABLET | Refills: 3 | Status: SHIPPED | OUTPATIENT
Start: 2020-01-01

## 2020-01-01 RX ORDER — VALACYCLOVIR HYDROCHLORIDE 1 G/1
TABLET, FILM COATED ORAL
Qty: 8 TABLET | Refills: 3 | Status: SHIPPED | OUTPATIENT
Start: 2020-01-01 | End: 2020-01-01

## 2020-02-19 DIAGNOSIS — I06.1 RHEUMATIC AORTIC INSUFFICIENCY: ICD-10-CM

## 2020-02-19 RX ORDER — PENICILLIN V POTASSIUM 250 MG/1
TABLET, FILM COATED ORAL
Qty: 180 TABLET | Refills: 3 | Status: SHIPPED | OUTPATIENT
Start: 2020-02-19 | End: 2020-01-01

## 2020-02-19 NOTE — TELEPHONE ENCOUNTER
Disp Refills Start End [facility]   penicillin V (VEETID) 250 MG tablet 180 tablet 3 2/27/2019  No   Sig: TAKE 1 TABLET BY MOUTH TWICE DAILY BEFORE MEALS       LOV: 10/30/2019  Future Office visit:  No future appointment scheduled at this time.    Routing refill request to provider for review/approval because:  Drug not on the [facility], [facility] or [facility] refill protocol or controlled substance    Requested Prescriptions   Pending Prescriptions Disp Refills     PENICILLIN V 250 MG PO tablet [Pharmacy Med Name: PENICILLIN VK 250MG TABLETS] 180 tablet 3     Sig: TAKE 1 TABLET BY MOUTH TWICE DAILY BEFORE MEALS       There is no refill protocol information for this order      Unable to complete prescription refill per RN Medication Refill Policy.................... Madison Shepherd RN ....................  2/19/2020   3:41 PM

## 2020-03-17 NOTE — TELEPHONE ENCOUNTER
ACETAMINOPHEN/COD #3 (300/30MG) TAB   Last Written Prescription Date:  9/26/2019  Last Fill Quantity: 12,   # refills: 5  Last Office Visit: 10/30/2019  Future Office visit:       Routing refill request to provider for review/approval because:  Drug not on the [facility], [facility] or [facility] refill protocol or controlled substance.  Will forward to provider for consideration.  Unable to complete prescription refill per RNMedication Refill Policy.................... Isabel Mata RN ....................  3/17/2020   7:47 AM

## 2020-04-17 NOTE — TELEPHONE ENCOUNTER
" Disp  Refills  Start  End  [facility]    valACYclovir (VALTREX) 1000 mg tablet  8 tablet  3  10/30/2019   No    Sig: TAKE 2 TABLETS BY MOUTH EVERY 12 HOURS FOR 2 DOSES AT ONSET OF COLD SORES        LOV: 10/30/2019  Future Office visit: No future appointment scheduled at this time.    Requested Prescriptions   Pending Prescriptions Disp Refills     valACYclovir (VALTREX) 1000 mg tablet [Pharmacy Med Name: VALACYCLOVIR 1GM TABLETS] 8 tablet 3     Sig: TAKE 2 TABLETS BY MOUTH EVERY 12 HOURS FOR 2 DOSES AT ONSET OF COLD SORES       Antivirals for Herpes Protocol Passed - 4/17/2020  2:45 PM        Passed - Patient is age 12 or older        Passed - Recent (12 mo) or future (30 days) visit within the authorizing provider's specialty     Patient has had an office visit with the authorizing provider or a provider within the authorizing providers department within the previous 12 mos or has a future within next 30 days. See \"Patient Info\" tab in inbasket, or \"Choose Columns\" in Meds & Orders section of the refill encounter.              Passed - Medication is active on med list        Passed - Normal serum creatinine on file in past 12 months     Recent Labs   Lab Test 10/30/19  0902   CR 1.01       Ok to refill medication if creatinine is low           Madison Shepherd RN  ....................  4/17/2020   2:56 PM            "

## 2020-07-02 NOTE — TELEPHONE ENCOUNTER
Disp  Refills  Start  End  [facility]    gabapentin (NEURONTIN) 300 MG capsule  360 capsule  11  5/6/2019   No    Sig - Route: Take 2 capsules (600 mg) by mouth 2 times daily - Oral        LOV: 10/30/2019  Future Office visit: No future appointment scheduled at this time       Routing refill request to provider for review/approval because:  Drug not on the [facility], [facility] or [facility] refill protocol or controlled substance    Requested Prescriptions   Pending Prescriptions Disp Refills     gabapentin (NEURONTIN) 300 MG capsule [Pharmacy Med Name: GABAPENTIN 300MG CAPSULES] 360 capsule 11     Sig: TAKE 2 CAPSULES(600 MG) BY MOUTH TWICE DAILY       There is no refill protocol information for this order      Unable to complete prescription refill per RN Medication Refill Policy.................... Madison Shepherd RN ....................  7/2/2020   4:16 PM

## 2020-09-14 NOTE — TELEPHONE ENCOUNTER
[facility] sent Rx request for the following: acetaminophen-codeine (TYLENOL #3) 300-30 MG tablet  SigTAKE 1 TABLET BY MOUTH EVERY 6 HOURS AS NEEDED FOR MODERATE PAIN    Last Prescription Date:   3/17/20  Last Fill Qty/Refills:         120, R-5    Last Office Visit:              10/30/19   Future Office visit:           None    Routing refill request to provider for review/approval because:  Drug not on the [facility], [facility] or  Health refill protocol or controlled substance    Stephani Vital RN on 9/14/2020 at 1:42 PM

## 2020-09-14 NOTE — TELEPHONE ENCOUNTER
PDMP Review       Value Time User    State PDMP site checked  Yes 9/14/2020  5:59 PM Harman Hogan MD        Prescription refilled.  Harman Hogan MD on 9/14/2020 at 6:00 PM

## 2020-11-10 NOTE — TELEPHONE ENCOUNTER
[facility] ([facility] is closed so they are needing prescriptions for acetaminophen and penicillin sent to them. Pt is out

## 2020-11-10 NOTE — TELEPHONE ENCOUNTER
I called [facility] and [email or web address] is open now so the patient can fill her Rx's at [email or web address] now.  Ofe Zhao LPN..................11/10/2020   12:20 PM

## 2020-12-09 NOTE — TELEPHONE ENCOUNTER
"Patient calling today reporting that she will be out of her Tylenol #3 tomorrow.   She switched from [facility] to the [facility] pharmacy last month and was able to get it transferred for a 1 month supply from [facility].  All of the refills on her prescription can not transfer, so [facility] sent a fax to [facility] yesterday requesting a new prescription.      No note of this refill encounter in Epic.    Advised patient to call back clinic [facility] during clinic hours and discuss with team and that triager would route details of our call to the team for follow-up.       Reason for Disposition    Caller requesting a NON-URGENT new prescription or refill and triager unable to refill per unit policy    Additional Information    Negative: Drug overdose and nurse unable to answer question    Negative: Caller requesting information not related to medicine    Negative: Caller requesting a prescription for Strep throat and has a positive culture result    Negative: Rash while taking a medication or within 3 days of stopping it    Negative: Immunization reaction suspected    Negative: [1] Asthma AND [2] having symptoms of asthma (cough, wheezing, etc)    Negative: MORE THAN A DOUBLE DOSE of a prescription or over-the-counter (OTC) drug    Negative: [1] DOUBLE DOSE (an extra dose or lesser amount) of over-the-counter (OTC) drug AND [2] any symptoms (e.g., dizziness, nausea, pain, sleepiness)    Negative: [1] DOUBLE DOSE (an extra dose or lesser amount) of prescription drug AND [2] any symptoms (e.g., dizziness, nausea, pain, sleepiness)    Negative: Took another person's prescription drug    Negative: [1] DOUBLE DOSE (an extra dose or lesser amount) of prescription drug AND [2] NO symptoms (Exception: a double dose of antibiotics)    Negative: Diabetes drug error or overdose (e.g., insulin or extra dose)    Negative: [1] Request for URGENT new prescription or refill of \"essential\" medication (i.e., " likelihood of harm to patient if not taken) AND [2] triager unable to fill per unit policy    Negative: [1] Prescription not at pharmacy AND [2] was prescribed today by PCP    Negative: Pharmacy calling with prescription questions and triager unable to answer question    Negative: Caller has urgent medication question about med that PCP prescribed and triager unable to answer question    Negative: Caller has NON-URGENT medication question about med that PCP prescribed and triager unable to answer question    Protocols used: MEDICATION QUESTION CALL-A-AH

## 2020-12-10 NOTE — TELEPHONE ENCOUNTER
Medications all filled to [facility] pharmacy.  Harman Hogan MD on 12/9/2020 at 6:34 PM

## 2021-01-01 ENCOUNTER — ANESTHESIA (OUTPATIENT)
Dept: EMERGENCY MEDICINE | Facility: OTHER | Age: 76
End: 2021-01-01

## 2021-01-01 ENCOUNTER — ANESTHESIA EVENT (OUTPATIENT)
Dept: EMERGENCY MEDICINE | Facility: OTHER | Age: 76
End: 2021-01-01

## 2021-01-01 ENCOUNTER — HOSPITAL ENCOUNTER (EMERGENCY)
Facility: OTHER | Age: 76
End: 2021-03-14
Attending: EMERGENCY MEDICINE | Admitting: EMERGENCY MEDICINE
Payer: MEDICARE

## 2021-01-01 VITALS
WEIGHT: 182.1 LBS | OXYGEN SATURATION: 80 % | BODY MASS INDEX: 29.27 KG/M2 | HEART RATE: 197 BPM | HEIGHT: 66 IN | RESPIRATION RATE: 101 BRPM

## 2021-01-01 DIAGNOSIS — I46.9 CARDIAC ARREST (H): ICD-10-CM

## 2021-01-01 PROCEDURE — 250N000009 HC RX 250

## 2021-01-01 PROCEDURE — 99291 CRITICAL CARE FIRST HOUR: CPT | Mod: 25 | Performed by: EMERGENCY MEDICINE

## 2021-01-01 PROCEDURE — 36680 INSERT NEEDLE BONE CAVITY: CPT | Performed by: EMERGENCY MEDICINE

## 2021-01-01 PROCEDURE — 96374 THER/PROPH/DIAG INJ IV PUSH: CPT | Performed by: EMERGENCY MEDICINE

## 2021-01-01 PROCEDURE — 92950 HEART/LUNG RESUSCITATION CPR: CPT | Performed by: EMERGENCY MEDICINE

## 2021-01-01 PROCEDURE — 999N000157 HC STATISTIC RCP TIME EA 10 MIN

## 2021-01-01 PROCEDURE — 96375 TX/PRO/DX INJ NEW DRUG ADDON: CPT | Mod: XU | Performed by: EMERGENCY MEDICINE

## 2021-01-01 PROCEDURE — 250N000011 HC RX IP 250 OP 636

## 2021-01-01 ASSESSMENT — MIFFLIN-ST. JEOR: SCORE: 1342.75

## 2021-03-14 NOTE — ED NOTES
"Daughters in small waiting room ([facility] Prashant).  Talked with them and they state that patient called [facility] yesterday and sounded more confused.  Prashant then drove up from [place] and stayed with her last night.  States she seemed more confused.  States this morning she pointed to her left side and said, \"My heart feels like it fell down here\"  Daughter said she gave her an Acetaminophen at 2 pm.  States, \"She kept asking for more.\"  "

## 2021-03-14 NOTE — ED PROVIDER NOTES
History   No chief complaint on file.    HPI  Annette Layton is a 75 year old female who comes in by ambulance CODE BLUE.  Paramedics presented to the scene and their initial EKG indicated an anterior STEMI so they called and preactivated the STEMI code.  We did call for flight crew right away based on their call.  They were still on scene working things when the patient began to deteriorate.  Her main complaint was not of chest pain but of abdominal and low back pain.  As they were getting ready to transport her she became unresponsive.  They put her on their defibrillator and they paced her for a short while successfully.  After this however the lost capture and were not getting any pulses so CPR was started.  By the time they arrived she had been shocked twice and had 2 rounds of epinephrine.  Still no return of spontaneous circulation.   Allergies:  Allergies   Allergen Reactions     Hydrocodone Nausea and Vomiting       Problem List:    Patient Active Problem List    Diagnosis Date Noted     Tobacco abuse 05/06/2019     Priority: Medium     Primary osteoarthritis of both knees 02/28/2018     Priority: Medium     Chronic low back pain, unspecified back pain laterality, with sciatica presence unspecified 02/28/2018     Priority: Medium     Anxiety 02/28/2018     Priority: Medium     Polyneuropathy 02/28/2018     Priority: Medium     Urinary incontinence, unspecified type 02/28/2018     Priority: Medium     Other chronic pain 02/28/2018     Priority: Medium     Abnormal TSH 02/16/2018     Priority: Medium     Hypothyroidism 06/21/2017     Priority: Medium        Past Medical History:    Past Medical History:   Diagnosis Date     Hypothyroidism        Past Surgical History:    Past Surgical History:   Procedure Laterality Date     hemithyroidectomy  1979     HYSTERECTOMY  1989       Family History:    No family history on file.    Social History:  Marital Status:   [5]  Social History     Tobacco Use      "Smoking status: Current Every Day Smoker     Packs/day: 1.00     Smokeless tobacco: Never Used   Substance Use Topics     Alcohol use: No     Drug use: No        Medications:    acetaminophen-codeine (TYLENOL #3) 300-30 MG tablet  gabapentin (NEURONTIN) 300 MG capsule  ketoconazole (NIZORAL) 2 % cream  penicillin V (VEETID) 250 MG tablet  valACYclovir (VALTREX) 1000 mg tablet          Review of Systems   Unable to perform ROS: Patient unresponsive       Physical Exam   Pulse: 197  Resp: (!) 101  Height: 167.6 cm (5' 6\")  Weight: 82.6 kg (182 lb 1.6 oz)  SpO2: (!) 80 %      Physical Exam  Vitals signs and nursing note reviewed.   Constitutional:       Interventions: She is intubated.   HENT:      Head: Normocephalic and atraumatic.      Mouth/Throat:      Mouth: Mucous membranes are dry.   Eyes:      Conjunctiva/sclera: Conjunctivae normal.      Comments: Pupils equal and fixed   Cardiovascular:      Comments: Cardiac rhythm was initially bradycardic and PEA.  At 1 point we did have a more regular rhythm but she continued to be in PEA.  No shockable rhythms were seen.  Pulmonary:      Effort: She is intubated.      Comments: Patient had endotracheal tube in place on arrival.  Paramedic reported 23 cm at the teeth.  On arrival when we got her on our monitor her end-tidal CO2 was only 2 or 3, so I used the glide scope and confirmed placement.  I was able to visualize the tube passing through the vocal cords.  Also she had equal and bilateral breath sounds with bag mask ventilation.  Abdominal:      General: Abdomen is flat.      Palpations: Abdomen is soft.   Skin:     General: Skin is cool and dry.   Neurological:      Mental Status: She is unresponsive.      GCS: GCS eye subscore is 1. GCS verbal subscore is 1. GCS motor subscore is 1.         ED Course     ED Course as of Mar 14 1639   Sun Mar 14, 2021   1635  She was moved into bay 1 and the CODE BLUE was continued.  While here she received multiple rounds of " epinephrine, she received bicarb and calcium.  She never had return of pulses.  During CPR I was unable to palpate any femoral pulses, but she did have good carotid pulses with compressions.  During the pulse checks I placed ultrasound.  I saw very minimal cardiac activity.        1636 CPR was continued for approximately 1/2-hour.  The patient's 2 daughters arrived and I was able to speak with them.  I told him that we are not having any return of her pulses and I did not see any cardiac activity to speak of.  At one point there was a little bit of cardiac activity but this was pulseless.  Given the amount of time down, she had been down approximately  45 minutes to an hour at this time, I believe chance of meaningful recovery was very low.  I recommended discontinuing CPR.  They came into the room to see her while we were still continuing CPR and it was discontinued at 1547 hrs.        Procedures          Critical Care time was 45 minutes for this patient excluding procedures.         No results found for this or any previous visit (from the past 24 hour(s)).    Medications - No data to display    Assessments & Plan (with Medical Decision Making)     I have reviewed the nursing notes.    I have reviewed the findings, diagnosis, plan and need for follow up with the patient.      New Prescriptions    No medications on file       Final diagnoses:   Cardiac arrest (H)       3/14/2021   [facility]     Avery Santamaria MD  03/14/21 8819

## 2021-03-14 NOTE — CONSULTS
Called to ED for code blue. Upon arrival, good IV access and ETT in situ at 23cm measured at the lips with bilateral course breath sounds. Unable to obtain pulse via palpation or with US of femoral artery. See code blue record for further details.

## 2021-03-14 NOTE — ED TRIAGE NOTES
EMS Arrival Note  ________________________________  Annette Layton is a 75 year old Female that arrives via [facility]  Pre hospital clinical presentation per EMS personnel includes pt called 911 for back and abdominal pain, on arrival STEMI on EKG, pt then needing to be paced, pt then cardiac arrest, CPR started and in progress on arrival.  Pre hospital personnel report ETT placed   Pre Hospital Cardiac rhythm reported as Bradycardia, STEMI, third degree block  Pre hospital care included: Medication:, Ketamine for intubation, Lidocaine for IO placement:, Spinal Precautions:C collar for tube stability, Respiratory Support: BVM assist, Orthopedic  CPRLucas and Epinephrine x 2   Patient arrives with:  GCS 3  Airway ETT tube placed by EMS    Circulation Assessment  Abnormal.  Patient arrives with a 20g IV at her right arm and IO in left leg not patent     Placed in room 01 CPR in progress, MD at bedside        Previous living situation Alone

## 2021-03-15 NOTE — PROGRESS NOTES
[facility] called, they stated they received consent from the daughters and their technician will be here around 2300. Also received call from [facility] they will  descendant first thing in the AM unless we call them before that.

## 2024-05-22 NOTE — TELEPHONE ENCOUNTER
"Received refill request from [facility] pharmacy for Effexor 75 mg daily.    Current med list Effexor 150 mg daily    LOV: 5/6/19  \"Drop the Effexor from 225 mg daily down to 150 mg daily. (only take the larger tab and stop the smaller one). \"    Called patient to verify dose and patient states that she finished her Effexor 150 mg and is to now taper down to 75 mg daily.    Will route to Harman Hogan for review and consideration.      Lavonne Smith RN on 7/3/2019 at 11:41 AM    " [facility]  [address], [address]  [place] [address]  [phone]    Social Work Follow-Up    LSW submitted order for provider signature for adult pull ups/incontinence supplies via [employer] through parachute ordering for patient.    LSW contacted [facility] to speak with patient's coordinator: Nato August [phone] regarding visit today, and new letter from provider stating patient lacks capacity to make medical & financial decisions and requires 24/7 care. Nato stated that letter can be faxed to [facility] to help with his hearing for the appeal/grievance that was submitted. LSW asked if new assessment can be completed with patient with this information from physician, Nato stated that assessment was already completed and we will have to wait and see the outcome of appeal/grievance. LSW faxed letter to [facility] [phone] to support pending appeal.    LSW called [facility], advised to call general protective service line. PARESHW spoke with Gloria at protective services to report all safety concerns for patient discussed at the visit today with provider. LSW stated patient requires 24/7 care, assistance with all ADLs other than transferring/feeding & IADLs, needs assistance with incontinence care as feces has been found all over the home, and medication management especially with patient being diabetic. LSW stated he has limited coverage through [employer] which there is an appeal in process to try to get more hours but it may not get approved for 24/7 coverage. PARESHW stated that patient has some support from a friend & sons who are POA but one son is not local, and will be coming to [place] from [place]. LSW stated that the son is interested in nursing home placement but no steps have been made yet to facilitate placement. LSW stated that provider also deemed patient to lack capacity to make medical or financial decisions. ROSY  answered all questions to best of LSW knowledge.    LSW to remain available as needed.